# Patient Record
Sex: FEMALE | Race: WHITE | NOT HISPANIC OR LATINO | Employment: FULL TIME | URBAN - METROPOLITAN AREA
[De-identification: names, ages, dates, MRNs, and addresses within clinical notes are randomized per-mention and may not be internally consistent; named-entity substitution may affect disease eponyms.]

---

## 2023-03-30 LAB
ALANINE AMINOTRANSFERASE (SGPT) (U/L) IN SER/PLAS: 13 U/L (ref 7–45)
ALBUMIN (G/DL) IN SER/PLAS: 4.5 G/DL (ref 3.4–5)
ALKALINE PHOSPHATASE (U/L) IN SER/PLAS: 75 U/L (ref 33–136)
ANION GAP IN SER/PLAS: 14 MMOL/L (ref 10–20)
ASPARTATE AMINOTRANSFERASE (SGOT) (U/L) IN SER/PLAS: 13 U/L (ref 9–39)
BILIRUBIN TOTAL (MG/DL) IN SER/PLAS: 0.4 MG/DL (ref 0–1.2)
CALCIUM (MG/DL) IN SER/PLAS: 9.6 MG/DL (ref 8.6–10.6)
CARBON DIOXIDE, TOTAL (MMOL/L) IN SER/PLAS: 24 MMOL/L (ref 21–32)
CHLORIDE (MMOL/L) IN SER/PLAS: 105 MMOL/L (ref 98–107)
CHOLESTEROL (MG/DL) IN SER/PLAS: 156 MG/DL (ref 0–199)
CHOLESTEROL IN HDL (MG/DL) IN SER/PLAS: 66.7 MG/DL
CHOLESTEROL/HDL RATIO: 2.3
CREATININE (MG/DL) IN SER/PLAS: 0.58 MG/DL (ref 0.5–1.05)
ESTIMATED AVERAGE GLUCOSE FOR HBA1C: 120 MG/DL
GFR FEMALE: >90 ML/MIN/1.73M2
GLUCOSE (MG/DL) IN SER/PLAS: 95 MG/DL (ref 74–99)
HEMOGLOBIN A1C/HEMOGLOBIN TOTAL IN BLOOD: 5.8 %
LDL: 66 MG/DL (ref 0–99)
POTASSIUM (MMOL/L) IN SER/PLAS: 4.3 MMOL/L (ref 3.5–5.3)
PROTEIN TOTAL: 7.3 G/DL (ref 6.4–8.2)
SODIUM (MMOL/L) IN SER/PLAS: 139 MMOL/L (ref 136–145)
THYROTROPIN (MIU/L) IN SER/PLAS BY DETECTION LIMIT <= 0.05 MIU/L: 0.01 MIU/L (ref 0.44–3.98)
THYROXINE (T4) FREE (NG/DL) IN SER/PLAS: 1.48 NG/DL (ref 0.78–1.48)
TRIGLYCERIDE (MG/DL) IN SER/PLAS: 115 MG/DL (ref 0–149)
UREA NITROGEN (MG/DL) IN SER/PLAS: 20 MG/DL (ref 6–23)
VLDL: 23 MG/DL (ref 0–40)

## 2023-03-31 ENCOUNTER — TELEPHONE (OUTPATIENT)
Dept: PRIMARY CARE | Facility: CLINIC | Age: 63
End: 2023-03-31
Payer: COMMERCIAL

## 2023-03-31 DIAGNOSIS — E03.9 HYPOTHYROIDISM, UNSPECIFIED TYPE: ICD-10-CM

## 2023-03-31 DIAGNOSIS — K21.9 GASTROESOPHAGEAL REFLUX DISEASE, UNSPECIFIED WHETHER ESOPHAGITIS PRESENT: ICD-10-CM

## 2023-03-31 DIAGNOSIS — I10 HYPERTENSION, UNSPECIFIED TYPE: ICD-10-CM

## 2023-03-31 RX ORDER — OLMESARTAN MEDOXOMIL 40 MG/1
40 TABLET ORAL DAILY
COMMUNITY
Start: 2022-01-11 | End: 2023-06-01 | Stop reason: SDUPTHER

## 2023-03-31 RX ORDER — ESOMEPRAZOLE MAGNESIUM 40 MG/1
40 CAPSULE, DELAYED RELEASE ORAL DAILY
Qty: 90 CAPSULE | Refills: 0 | Status: SHIPPED | OUTPATIENT
Start: 2023-03-31 | End: 2023-04-25 | Stop reason: SDUPTHER

## 2023-03-31 RX ORDER — ASPIRIN 81 MG/1
81 TABLET ORAL DAILY
Qty: 90 TABLET | Refills: 0 | Status: SHIPPED | OUTPATIENT
Start: 2023-03-31 | End: 2023-04-25 | Stop reason: SDUPTHER

## 2023-03-31 RX ORDER — ESOMEPRAZOLE MAGNESIUM 40 MG/1
1 CAPSULE, DELAYED RELEASE ORAL DAILY
COMMUNITY
Start: 2020-01-22 | End: 2023-03-31 | Stop reason: SDUPTHER

## 2023-03-31 RX ORDER — ASPIRIN 81 MG/1
1 TABLET ORAL DAILY
COMMUNITY
Start: 2021-10-06 | End: 2023-03-31 | Stop reason: SDUPTHER

## 2023-03-31 RX ORDER — LEVOTHYROXINE SODIUM 150 UG/1
1 TABLET ORAL DAILY
COMMUNITY
Start: 2018-08-09 | End: 2023-03-31 | Stop reason: SDUPTHER

## 2023-03-31 RX ORDER — LEVOTHYROXINE SODIUM 150 UG/1
150 TABLET ORAL DAILY
Qty: 90 TABLET | Refills: 0 | Status: SHIPPED | OUTPATIENT
Start: 2023-03-31 | End: 2023-04-25 | Stop reason: SDUPTHER

## 2023-03-31 NOTE — TELEPHONE ENCOUNTER
Last appt-11/29/22  Next appt-4/25/23  Last BW-3/30/23   smm    Spoke to pt and cardiology fills Olmesartan and pt will call them.

## 2023-03-31 NOTE — TELEPHONE ENCOUNTER
Pt called requesting Rx refills on   Asprin 81 mg   Esomeprazole 40 mg (out)  Olmesartin 40 mg   Synthroid 150 mg   Send to [facility]   Scheduled 4/25/23

## 2023-04-03 PROBLEM — M25.562 BILATERAL KNEE PAIN: Status: ACTIVE | Noted: 2023-04-03

## 2023-04-03 PROBLEM — M54.16 LUMBAR RADICULAR PAIN: Status: ACTIVE | Noted: 2023-04-03
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
## 2023-04-03 PROBLEM — H25.813 COMBINED FORM OF SENILE CATARACT OF BOTH EYES: Status: ACTIVE | Noted: 2023-04-03

## 2023-04-03 PROBLEM — N95.2 ATROPHIC VAGINITIS: Status: ACTIVE | Noted: 2023-04-03

## 2023-04-03 PROBLEM — J20.9 BRONCHITIS, ACUTE: Status: ACTIVE | Noted: 2023-04-03

## 2023-04-03 PROBLEM — K21.9 GERD (GASTROESOPHAGEAL REFLUX DISEASE): Status: ACTIVE | Noted: 2023-04-03

## 2023-04-03 PROBLEM — M25.561 RIGHT KNEE PAIN: Status: ACTIVE | Noted: 2023-04-03

## 2023-04-03 PROBLEM — M19.90 ARTHRITIS: Status: ACTIVE | Noted: 2023-04-03

## 2023-04-03 PROBLEM — B34.9 VIRAL INFECTION: Status: ACTIVE | Noted: 2023-04-03

## 2023-04-03 PROBLEM — H93.19 TINNITUS: Status: ACTIVE | Noted: 2023-04-03

## 2023-04-03 PROBLEM — M25.551 HIP PAIN, RIGHT: Status: ACTIVE | Noted: 2023-04-03

## 2023-04-03 PROBLEM — R20.0 NUMBNESS AND TINGLING OF HAND: Status: ACTIVE | Noted: 2023-04-03

## 2023-04-03 PROBLEM — R20.2 NUMBNESS AND TINGLING OF HAND: Status: ACTIVE | Noted: 2023-04-03

## 2023-04-03 PROBLEM — H52.03 HYPERMETROPIA OF BOTH EYES: Status: ACTIVE | Noted: 2023-04-03

## 2023-04-03 PROBLEM — J45.909 ASTHMATIC BRONCHITIS (HHS-HCC): Status: ACTIVE | Noted: 2023-04-03

## 2023-04-03 PROBLEM — R06.00 DYSPNEA: Status: ACTIVE | Noted: 2023-04-03

## 2023-04-03 PROBLEM — Z96.641 STATUS POST TOTAL REPLACEMENT OF RIGHT HIP: Status: ACTIVE | Noted: 2023-04-03

## 2023-04-03 PROBLEM — J01.00 ACUTE MAXILLARY SINUSITIS: Status: ACTIVE | Noted: 2023-04-03

## 2023-04-03 PROBLEM — H52.4 MYOPIA WITH PRESBYOPIA OF BOTH EYES: Status: ACTIVE | Noted: 2023-04-03

## 2023-04-03 PROBLEM — G57.11 MERALGIA PARESTHETICA OF RIGHT SIDE: Status: ACTIVE | Noted: 2023-04-03

## 2023-04-03 PROBLEM — M51.36 OTHER INTERVERTEBRAL DISC DEGENERATION, LUMBAR REGION: Status: ACTIVE | Noted: 2023-04-03

## 2023-04-03 PROBLEM — R30.0 DYSURIA: Status: ACTIVE | Noted: 2023-04-03

## 2023-04-03 PROBLEM — R94.39 ABNORMAL STRESS TEST: Status: ACTIVE | Noted: 2023-04-03

## 2023-04-03 PROBLEM — E07.9 THYROID TROUBLE: Status: ACTIVE | Noted: 2023-04-03

## 2023-04-03 PROBLEM — L73.9 FOLLICULITIS: Status: ACTIVE | Noted: 2023-04-03

## 2023-04-03 PROBLEM — M25.561 BILATERAL KNEE PAIN: Status: ACTIVE | Noted: 2023-04-03

## 2023-04-03 PROBLEM — H52.13 MYOPIA WITH PRESBYOPIA OF BOTH EYES: Status: ACTIVE | Noted: 2023-04-03

## 2023-04-03 PROBLEM — R51.9 HEADACHE: Status: ACTIVE | Noted: 2023-04-03

## 2023-04-03 PROBLEM — E11.9 TYPE 2 DIABETES MELLITUS WITHOUT COMPLICATIONS (MULTI): Status: ACTIVE | Noted: 2023-04-03

## 2023-04-03 PROBLEM — N64.4 BREAST TENDERNESS: Status: ACTIVE | Noted: 2023-04-03

## 2023-04-03 PROBLEM — M51.369 OTHER INTERVERTEBRAL DISC DEGENERATION, LUMBAR REGION: Status: ACTIVE | Noted: 2023-04-03

## 2023-04-03 PROBLEM — M54.9 BACK PAIN: Status: ACTIVE | Noted: 2023-04-03

## 2023-04-03 PROBLEM — F41.9 ANXIETY: Status: ACTIVE | Noted: 2023-04-03

## 2023-04-03 PROBLEM — R06.02 EXERTIONAL SHORTNESS OF BREATH: Status: ACTIVE | Noted: 2023-04-03

## 2023-04-03 PROBLEM — E78.5 HYPERLIPIDEMIA: Status: ACTIVE | Noted: 2023-04-03

## 2023-04-03 PROBLEM — E03.9 HYPOTHYROIDISM: Status: ACTIVE | Noted: 2023-04-03

## 2023-04-03 PROBLEM — I10 HYPERTENSION: Status: ACTIVE | Noted: 2023-04-03

## 2023-04-03 PROBLEM — J32.9 SINUSITIS: Status: ACTIVE | Noted: 2023-04-03

## 2023-04-03 PROBLEM — T75.3XXA MOTION SICKNESS: Status: ACTIVE | Noted: 2023-04-03

## 2023-04-03 RX ORDER — SCOLOPAMINE TRANSDERMAL SYSTEM 1 MG/1
1 PATCH, EXTENDED RELEASE TRANSDERMAL
COMMUNITY
Start: 2022-06-10 | End: 2023-04-25 | Stop reason: SDUPTHER

## 2023-04-03 RX ORDER — ROSUVASTATIN CALCIUM 40 MG/1
1 TABLET, COATED ORAL DAILY
COMMUNITY
Start: 2022-01-11 | End: 2023-04-25 | Stop reason: SDUPTHER

## 2023-04-03 RX ORDER — METFORMIN HYDROCHLORIDE 500 MG/1
500 TABLET ORAL EVERY 12 HOURS
COMMUNITY
End: 2023-04-25 | Stop reason: SDUPTHER

## 2023-04-03 RX ORDER — SERTRALINE HYDROCHLORIDE 100 MG/1
1 TABLET, FILM COATED ORAL DAILY
COMMUNITY
Start: 2016-04-18 | End: 2023-04-25 | Stop reason: SDUPTHER

## 2023-04-03 RX ORDER — METHYLPREDNISOLONE 4 MG/1
4 TABLET ORAL
COMMUNITY
Start: 2023-03-23 | End: 2023-04-25 | Stop reason: ALTCHOICE

## 2023-04-25 ENCOUNTER — OFFICE VISIT (OUTPATIENT)
Dept: PRIMARY CARE | Facility: CLINIC | Age: 63
End: 2023-04-25
Payer: COMMERCIAL

## 2023-04-25 VITALS
TEMPERATURE: 98.3 F | WEIGHT: 194 LBS | SYSTOLIC BLOOD PRESSURE: 122 MMHG | BODY MASS INDEX: 33.3 KG/M2 | DIASTOLIC BLOOD PRESSURE: 68 MMHG

## 2023-04-25 DIAGNOSIS — Z79.4 TYPE 2 DIABETES MELLITUS WITHOUT COMPLICATION, WITH LONG-TERM CURRENT USE OF INSULIN (MULTI): Primary | ICD-10-CM

## 2023-04-25 DIAGNOSIS — E78.5 HYPERLIPIDEMIA, UNSPECIFIED HYPERLIPIDEMIA TYPE: ICD-10-CM

## 2023-04-25 DIAGNOSIS — T75.3XXS MOTION SICKNESS, SEQUELA: ICD-10-CM

## 2023-04-25 DIAGNOSIS — K21.9 GASTROESOPHAGEAL REFLUX DISEASE, UNSPECIFIED WHETHER ESOPHAGITIS PRESENT: ICD-10-CM

## 2023-04-25 DIAGNOSIS — F41.9 ANXIETY: ICD-10-CM

## 2023-04-25 DIAGNOSIS — E11.9 TYPE 2 DIABETES MELLITUS WITHOUT COMPLICATION, WITH LONG-TERM CURRENT USE OF INSULIN (MULTI): Primary | ICD-10-CM

## 2023-04-25 DIAGNOSIS — R23.2 FACIAL FLUSHING: ICD-10-CM

## 2023-04-25 DIAGNOSIS — E03.9 HYPOTHYROIDISM, UNSPECIFIED TYPE: ICD-10-CM

## 2023-04-25 DIAGNOSIS — I10 HYPERTENSION, UNSPECIFIED TYPE: ICD-10-CM

## 2023-04-25 PROCEDURE — 3078F DIAST BP <80 MM HG: CPT | Performed by: FAMILY MEDICINE

## 2023-04-25 PROCEDURE — 1036F TOBACCO NON-USER: CPT | Performed by: FAMILY MEDICINE

## 2023-04-25 PROCEDURE — 3044F HG A1C LEVEL LT 7.0%: CPT | Performed by: FAMILY MEDICINE

## 2023-04-25 PROCEDURE — 99214 OFFICE O/P EST MOD 30 MIN: CPT | Performed by: FAMILY MEDICINE

## 2023-04-25 PROCEDURE — 4010F ACE/ARB THERAPY RXD/TAKEN: CPT | Performed by: FAMILY MEDICINE

## 2023-04-25 PROCEDURE — 3074F SYST BP LT 130 MM HG: CPT | Performed by: FAMILY MEDICINE

## 2023-04-25 RX ORDER — METFORMIN HYDROCHLORIDE 500 MG/1
500 TABLET ORAL EVERY 12 HOURS
Qty: 180 TABLET | Refills: 1 | Status: SHIPPED | OUTPATIENT
Start: 2023-04-25 | End: 2023-11-07 | Stop reason: SDUPTHER

## 2023-04-25 RX ORDER — ICOSAPENT ETHYL 1 G/1
2 CAPSULE ORAL
COMMUNITY
End: 2023-06-01 | Stop reason: SDUPTHER

## 2023-04-25 RX ORDER — ROSUVASTATIN CALCIUM 40 MG/1
40 TABLET, COATED ORAL DAILY
Qty: 90 TABLET | Refills: 1 | Status: SHIPPED | OUTPATIENT
Start: 2023-04-25 | End: 2023-11-07 | Stop reason: SDUPTHER

## 2023-04-25 RX ORDER — ASPIRIN 81 MG/1
81 TABLET ORAL DAILY
Qty: 90 TABLET | Refills: 3 | Status: SHIPPED | OUTPATIENT
Start: 2023-04-25

## 2023-04-25 RX ORDER — ESOMEPRAZOLE MAGNESIUM 40 MG/1
40 CAPSULE, DELAYED RELEASE ORAL DAILY
Qty: 90 CAPSULE | Refills: 1 | Status: SHIPPED | OUTPATIENT
Start: 2023-04-25 | End: 2023-11-07 | Stop reason: SDUPTHER

## 2023-04-25 RX ORDER — SCOLOPAMINE TRANSDERMAL SYSTEM 1 MG/1
1 PATCH, EXTENDED RELEASE TRANSDERMAL
Qty: 10 PATCH | Refills: 0 | Status: SHIPPED | OUTPATIENT
Start: 2023-04-25 | End: 2023-11-07 | Stop reason: ALTCHOICE

## 2023-04-25 RX ORDER — SERTRALINE HYDROCHLORIDE 100 MG/1
100 TABLET, FILM COATED ORAL DAILY
Qty: 90 TABLET | Refills: 1 | Status: SHIPPED | OUTPATIENT
Start: 2023-04-25 | End: 2023-11-07 | Stop reason: SDUPTHER

## 2023-04-25 RX ORDER — ALPRAZOLAM 0.5 MG/1
0.5 TABLET ORAL 3 TIMES DAILY PRN
Qty: 10 TABLET | Refills: 0 | Status: SHIPPED | OUTPATIENT
Start: 2023-04-25 | End: 2023-11-07 | Stop reason: ALTCHOICE

## 2023-04-25 RX ORDER — LEVOTHYROXINE SODIUM 150 UG/1
150 TABLET ORAL DAILY
Qty: 90 TABLET | Refills: 1 | Status: SHIPPED | OUTPATIENT
Start: 2023-04-25 | End: 2023-11-07 | Stop reason: SDUPTHER

## 2023-04-25 NOTE — PROGRESS NOTES
Subjective   Patient ID: Mary العراقي is a 62 y.o. female who presents for Follow-up (EP. Here for follow up on blood work and medication.).  HPI  Feels well, no specific complaints or concerns today.  Saw 2 different doctors- one for hip pain and one for back pain, then was referred pain mgmt- may be getting epidural injection    Requesting something to help with anxiety on long flight to [place]     Sees Dr. Farnsworth (cardiologist)    Sees Dr. Aldrich for gyn care     Review of Systems  General: no fever or night sweats, no change in weight  Eyes: no vision disturbance  ENT: no mouth lesions, no sore throat, and no dysphagia  CV: no chest pain, no palpitations, no lower extremity edema  Resp: no shortness of breath, no cough  GI: no abdominal pain, no change in bowel habits  : no urinary problems  MSK: no arthralgias, myalgias, or back pain  Skin; no rashes or new/changed skin lesions  Neuro: no headaches      Objective   Visit Vitals  /68   Temp 36.8 °C (98.3 °F) (Oral)      Physical Exam  Alert, well-appearing.    Neck: Supple. No palpable masses. Thyroid was not enlarged.    CVS: RRR, no murmurs. No peripheral edema.    Respiratory: Normal inspirations and expirations. Clear and equal breath sounds.    GI: Soft, nontender, no masses or hepatosplenomegaly.    Reviewed recent labs: all WNL including HgbA!C 5.8%  Assessment/Plan   Diagnoses and all orders for this visit:  Type 2 diabetes mellitus without complication, with long-term current use of insulin (CMS/[facility])  -     metFORMIN (Glucophage) 500 mg tablet; Take 1 tablet (500 mg) by mouth in the morning and 1 tablet (500 mg) in the evening. Take with food..  Hypothyroidism, unspecified type  -     Synthroid 150 mcg tablet; Take 1 tablet (150 mcg) by mouth once daily.  Hypertension, unspecified type  -     aspirin 81 mg EC tablet; Take 1 tablet (81 mg) by mouth once daily.  Anxiety  -     sertraline (Zoloft) 100 mg tablet; Take 1 tablet (100 mg) by mouth once  daily.  -     ALPRAZolam (Xanax) 0.5 mg tablet; Take 1 tablet (0.5 mg) by mouth 3 times a day as needed for anxiety.  Motion sickness, sequela  -     scopolamine (Transderm-Scop) 1 mg over 3 days patch 3 day; Place 1 patch on the skin every 3 days.  Hyperlipidemia, unspecified hyperlipidemia type  -     rosuvastatin (Crestor) 40 mg tablet; Take 1 tablet (40 mg) by mouth once daily.  Gastroesophageal reflux disease, unspecified whether esophagitis present  -     esomeprazole (NexIUM) 40 mg DR capsule; Take 1 capsule (40 mg) by mouth once daily.  Facial flushing  -     Referral to Dermatology; Future        Martha Thompson MD  Family Medicine   [facility]

## 2023-05-16 ENCOUNTER — APPOINTMENT (OUTPATIENT)
Dept: PRIMARY CARE | Facility: CLINIC | Age: 63
End: 2023-05-16
Payer: COMMERCIAL

## 2023-06-01 DIAGNOSIS — I10 HYPERTENSION, UNSPECIFIED TYPE: ICD-10-CM

## 2023-06-01 DIAGNOSIS — E78.5 HYPERLIPIDEMIA, UNSPECIFIED HYPERLIPIDEMIA TYPE: ICD-10-CM

## 2023-06-01 NOTE — TELEPHONE ENCOUNTER
Pt called and explained she no longer has to see her cardiologist, Dr. Farnsworth and she needs refills on her medication prescribed by him.  His office advised pt to call PCP for refills.   Omesartan 40 mg, 1 every day   Vasbeta 1 gm, 2 capsules BID   Send to [email or web address] for 90 day supply per insurance   Last seen 04/25/23    Pt is leaving for vacation on  6/11/23

## 2023-06-02 RX ORDER — OLMESARTAN MEDOXOMIL 40 MG/1
40 TABLET ORAL DAILY
Qty: 90 TABLET | Refills: 0 | Status: SHIPPED | OUTPATIENT
Start: 2023-06-02 | End: 2023-10-06 | Stop reason: SDUPTHER

## 2023-06-02 RX ORDER — ICOSAPENT ETHYL 1 G/1
2 CAPSULE ORAL
Qty: 360 CAPSULE | Refills: 0 | Status: SHIPPED | OUTPATIENT
Start: 2023-06-02 | End: 2023-11-07 | Stop reason: SDUPTHER

## 2023-10-06 DIAGNOSIS — I10 HYPERTENSION, UNSPECIFIED TYPE: ICD-10-CM

## 2023-10-06 RX ORDER — OLMESARTAN MEDOXOMIL 40 MG/1
40 TABLET ORAL DAILY
Qty: 90 TABLET | Refills: 0 | Status: SHIPPED | OUTPATIENT
Start: 2023-10-06 | End: 2023-11-07 | Stop reason: SDUPTHER

## 2023-10-06 NOTE — TELEPHONE ENCOUNTER
Patient ldm on machine, needs refill on  olmesartan (BENIcar) 40 mg - out of RX for 3 wks  Send to [facility]  Last appt. 4/25/23  Upcoming appt. None - I called & left message for patient to make an appt.

## 2023-10-20 DIAGNOSIS — E11.9 TYPE 2 DIABETES MELLITUS WITHOUT COMPLICATION, WITH LONG-TERM CURRENT USE OF INSULIN (MULTI): ICD-10-CM

## 2023-10-20 DIAGNOSIS — I10 HYPERTENSION, UNSPECIFIED TYPE: ICD-10-CM

## 2023-10-20 DIAGNOSIS — E78.5 HYPERLIPIDEMIA, UNSPECIFIED HYPERLIPIDEMIA TYPE: ICD-10-CM

## 2023-10-20 DIAGNOSIS — E03.9 HYPOTHYROIDISM, UNSPECIFIED TYPE: ICD-10-CM

## 2023-10-20 DIAGNOSIS — Z79.4 TYPE 2 DIABETES MELLITUS WITHOUT COMPLICATION, WITH LONG-TERM CURRENT USE OF INSULIN (MULTI): ICD-10-CM

## 2023-11-06 ENCOUNTER — LAB (OUTPATIENT)
Dept: LAB | Facility: LAB | Age: 63
End: 2023-11-06
Payer: COMMERCIAL

## 2023-11-06 DIAGNOSIS — E78.5 HYPERLIPIDEMIA, UNSPECIFIED HYPERLIPIDEMIA TYPE: ICD-10-CM

## 2023-11-06 DIAGNOSIS — Z79.4 TYPE 2 DIABETES MELLITUS WITHOUT COMPLICATION, WITH LONG-TERM CURRENT USE OF INSULIN (MULTI): ICD-10-CM

## 2023-11-06 DIAGNOSIS — I10 HYPERTENSION, UNSPECIFIED TYPE: ICD-10-CM

## 2023-11-06 DIAGNOSIS — E03.9 HYPOTHYROIDISM, UNSPECIFIED TYPE: ICD-10-CM

## 2023-11-06 DIAGNOSIS — E11.9 TYPE 2 DIABETES MELLITUS WITHOUT COMPLICATION, WITH LONG-TERM CURRENT USE OF INSULIN (MULTI): ICD-10-CM

## 2023-11-06 LAB
ALBUMIN SERPL BCP-MCNC: 4.4 G/DL (ref 3.4–5)
ALP SERPL-CCNC: 69 U/L (ref 33–136)
ALT SERPL W P-5'-P-CCNC: 18 U/L (ref 7–45)
ANION GAP SERPL CALC-SCNC: 18 MMOL/L (ref 10–20)
AST SERPL W P-5'-P-CCNC: 21 U/L (ref 9–39)
BILIRUB SERPL-MCNC: 0.5 MG/DL (ref 0–1.2)
BUN SERPL-MCNC: 23 MG/DL (ref 6–23)
CALCIUM SERPL-MCNC: 9.5 MG/DL (ref 8.6–10.6)
CHLORIDE SERPL-SCNC: 104 MMOL/L (ref 98–107)
CHOLEST SERPL-MCNC: 173 MG/DL (ref 0–199)
CHOLESTEROL/HDL RATIO: 3.8
CO2 SERPL-SCNC: 20 MMOL/L (ref 21–32)
CREAT SERPL-MCNC: 0.82 MG/DL (ref 0.5–1.05)
EST. AVERAGE GLUCOSE BLD GHB EST-MCNC: 120 MG/DL
GFR SERPL CREATININE-BSD FRML MDRD: 80 ML/MIN/1.73M*2
GLUCOSE SERPL-MCNC: 105 MG/DL (ref 74–99)
HBA1C MFR BLD: 5.8 %
HDLC SERPL-MCNC: 45.4 MG/DL
LDLC SERPL CALC-MCNC: 81 MG/DL
NON HDL CHOLESTEROL: 128 MG/DL (ref 0–149)
POTASSIUM SERPL-SCNC: 4.3 MMOL/L (ref 3.5–5.3)
PROT SERPL-MCNC: 7 G/DL (ref 6.4–8.2)
SODIUM SERPL-SCNC: 138 MMOL/L (ref 136–145)
T4 FREE SERPL-MCNC: 1.27 NG/DL (ref 0.78–1.48)
TRIGL SERPL-MCNC: 235 MG/DL (ref 0–149)
TSH SERPL-ACNC: 0.01 MIU/L (ref 0.44–3.98)
VLDL: 47 MG/DL (ref 0–40)

## 2023-11-06 PROCEDURE — 80061 LIPID PANEL: CPT

## 2023-11-06 PROCEDURE — 83036 HEMOGLOBIN GLYCOSYLATED A1C: CPT

## 2023-11-06 PROCEDURE — 84443 ASSAY THYROID STIM HORMONE: CPT

## 2023-11-06 PROCEDURE — 36415 COLL VENOUS BLD VENIPUNCTURE: CPT

## 2023-11-06 PROCEDURE — 84439 ASSAY OF FREE THYROXINE: CPT

## 2023-11-06 PROCEDURE — 80053 COMPREHEN METABOLIC PANEL: CPT

## 2023-11-07 ENCOUNTER — OFFICE VISIT (OUTPATIENT)
Dept: PRIMARY CARE | Facility: CLINIC | Age: 63
End: 2023-11-07
Payer: COMMERCIAL

## 2023-11-07 VITALS
DIASTOLIC BLOOD PRESSURE: 76 MMHG | TEMPERATURE: 98.6 F | BODY MASS INDEX: 34.33 KG/M2 | WEIGHT: 200 LBS | SYSTOLIC BLOOD PRESSURE: 128 MMHG

## 2023-11-07 DIAGNOSIS — I10 HYPERTENSION, UNSPECIFIED TYPE: ICD-10-CM

## 2023-11-07 DIAGNOSIS — E03.9 HYPOTHYROIDISM, UNSPECIFIED TYPE: ICD-10-CM

## 2023-11-07 DIAGNOSIS — L90.8 SKIN AGING: ICD-10-CM

## 2023-11-07 DIAGNOSIS — E11.9 TYPE 2 DIABETES MELLITUS WITHOUT COMPLICATION, WITHOUT LONG-TERM CURRENT USE OF INSULIN (MULTI): Primary | ICD-10-CM

## 2023-11-07 DIAGNOSIS — E78.5 HYPERLIPIDEMIA, UNSPECIFIED HYPERLIPIDEMIA TYPE: ICD-10-CM

## 2023-11-07 DIAGNOSIS — F41.9 ANXIETY: ICD-10-CM

## 2023-11-07 DIAGNOSIS — K21.9 GASTROESOPHAGEAL REFLUX DISEASE, UNSPECIFIED WHETHER ESOPHAGITIS PRESENT: ICD-10-CM

## 2023-11-07 DIAGNOSIS — R22.33 NODULE OF FINGER OF BOTH HANDS: ICD-10-CM

## 2023-11-07 PROCEDURE — 3078F DIAST BP <80 MM HG: CPT | Performed by: FAMILY MEDICINE

## 2023-11-07 PROCEDURE — 3074F SYST BP LT 130 MM HG: CPT | Performed by: FAMILY MEDICINE

## 2023-11-07 PROCEDURE — 1036F TOBACCO NON-USER: CPT | Performed by: FAMILY MEDICINE

## 2023-11-07 PROCEDURE — 4010F ACE/ARB THERAPY RXD/TAKEN: CPT | Performed by: FAMILY MEDICINE

## 2023-11-07 PROCEDURE — 3048F LDL-C <100 MG/DL: CPT | Performed by: FAMILY MEDICINE

## 2023-11-07 PROCEDURE — 3044F HG A1C LEVEL LT 7.0%: CPT | Performed by: FAMILY MEDICINE

## 2023-11-07 PROCEDURE — 99214 OFFICE O/P EST MOD 30 MIN: CPT | Performed by: FAMILY MEDICINE

## 2023-11-07 RX ORDER — LEVOTHYROXINE SODIUM 150 UG/1
150 TABLET ORAL DAILY
Qty: 90 TABLET | Refills: 1 | Status: SHIPPED | OUTPATIENT
Start: 2023-11-07 | End: 2024-05-31 | Stop reason: SDUPTHER

## 2023-11-07 RX ORDER — OLMESARTAN MEDOXOMIL 40 MG/1
40 TABLET ORAL DAILY
Qty: 90 TABLET | Refills: 1 | Status: SHIPPED | OUTPATIENT
Start: 2023-11-07 | End: 2024-05-31 | Stop reason: SDUPTHER

## 2023-11-07 RX ORDER — ESOMEPRAZOLE MAGNESIUM 40 MG/1
40 CAPSULE, DELAYED RELEASE ORAL DAILY
Qty: 90 CAPSULE | Refills: 1 | Status: SHIPPED | OUTPATIENT
Start: 2023-11-07 | End: 2024-05-21 | Stop reason: SDUPTHER

## 2023-11-07 RX ORDER — ROSUVASTATIN CALCIUM 40 MG/1
40 TABLET, COATED ORAL DAILY
Qty: 90 TABLET | Refills: 1 | Status: SHIPPED | OUTPATIENT
Start: 2023-11-07 | End: 2024-05-31 | Stop reason: SDUPTHER

## 2023-11-07 RX ORDER — SERTRALINE HYDROCHLORIDE 100 MG/1
100 TABLET, FILM COATED ORAL DAILY
Qty: 90 TABLET | Refills: 1 | Status: SHIPPED | OUTPATIENT
Start: 2023-11-07 | End: 2024-05-31 | Stop reason: SDUPTHER

## 2023-11-07 RX ORDER — ICOSAPENT ETHYL 1 G/1
2 CAPSULE ORAL
Qty: 360 CAPSULE | Refills: 1 | Status: SHIPPED | OUTPATIENT
Start: 2023-11-07 | End: 2024-05-31 | Stop reason: SDUPTHER

## 2023-11-07 RX ORDER — METFORMIN HYDROCHLORIDE 500 MG/1
500 TABLET ORAL EVERY 12 HOURS
Qty: 180 TABLET | Refills: 1 | Status: SHIPPED | OUTPATIENT
Start: 2023-11-07 | End: 2024-05-31 | Stop reason: SDUPTHER

## 2023-11-07 NOTE — PROGRESS NOTES
Subjective   Patient ID: Mary العراقي is a 63 y.o. female who presents for Follow-up (EP. Here for follow up ,blood work and medication.).  HPI  Feels well  Concerned about nodules on fingers  C/o feeling gasy and bloated   Has had upper endoscopy and colonoscopy  Would like to see a dermatologist for some skin issues   Wants to try to wean off sertraline, thinks she may not need it  Review of Systems  Genl: The patient has been in good health without recent weight change, fevers, or night sweats  CVS: No chest pain, irregular heartbeat, shortness of breath, or swollen ankles  Resp: No cough or difficulty breathing  GI: No change in bowel habits or abdominal pain. No heartburn  : No urinary problems.    Objective   Visit Vitals  /76   Temp 37 °C (98.6 °F) (Oral)      Physical Exam  Alert, well-appearing.    Neck: Supple. No palpable masses. Thyroid was not enlarged.    CVS: RRR, no murmurs. No peripheral edema.    Respiratory: Normal inspirations and expirations. Clear and equal breath sounds.    GI: Soft, nontender, no masses or hepatosplenomegaly.     Assessment/Plan   Diagnoses and all orders for this visit:  Type 2 diabetes mellitus without complication, without long-term current use of insulin (CMS/[facility])  -     metFORMIN (Glucophage) 500 mg tablet; Take 1 tablet (500 mg) by mouth every 12 hours. Take with food.  Nodule of finger of both hands  -     Referral to Orthopaedic Surgery; Future  Skin aging  -     Referral to Dermatology  Gastroesophageal reflux disease, unspecified whether esophagitis present  -     esomeprazole (NexIUM) 40 mg DR capsule; Take 1 capsule (40 mg) by mouth once daily.  Hypertension, unspecified type  -     olmesartan (BENIcar) 40 mg tablet; Take 1 tablet (40 mg) by mouth once daily.  Hyperlipidemia, unspecified hyperlipidemia type  -     rosuvastatin (Crestor) 40 mg tablet; Take 1 tablet (40 mg) by mouth once daily.  -     icosapent ethyL (Vascepa) 1 gram capsule; Take 2 capsules  (2 g) by mouth 2 times a day with meals.  Hypothyroidism, unspecified type  -     Synthroid 150 mcg tablet; Take 1 tablet (150 mcg) by mouth once daily.  Anxiety  -     sertraline (Zoloft) 100 mg tablet; Take 1 tablet (100 mg) by mouth once daily.      Contact GI doctor for appointment to further discuss her GI concerns (gas and bloating)      Martha Thompson MD  Family Medicine   [facility]

## 2024-02-26 ENCOUNTER — OFFICE VISIT (OUTPATIENT)
Dept: ORTHOPEDIC SURGERY | Facility: CLINIC | Age: 64
End: 2024-02-26
Payer: COMMERCIAL

## 2024-02-26 VITALS — BODY MASS INDEX: 33.97 KG/M2 | HEIGHT: 64 IN | WEIGHT: 199 LBS

## 2024-02-26 DIAGNOSIS — M79.644 CHRONIC PAIN OF RIGHT THUMB: Primary | ICD-10-CM

## 2024-02-26 DIAGNOSIS — M65.311 TRIGGER THUMB OF RIGHT HAND: ICD-10-CM

## 2024-02-26 DIAGNOSIS — G89.29 CHRONIC PAIN OF RIGHT THUMB: Primary | ICD-10-CM

## 2024-02-26 PROCEDURE — 99213 OFFICE O/P EST LOW 20 MIN: CPT | Performed by: ORTHOPAEDIC SURGERY

## 2024-02-26 PROCEDURE — 1036F TOBACCO NON-USER: CPT | Performed by: ORTHOPAEDIC SURGERY

## 2024-02-26 PROCEDURE — 4010F ACE/ARB THERAPY RXD/TAKEN: CPT | Performed by: ORTHOPAEDIC SURGERY

## 2024-02-26 PROCEDURE — 20550 NJX 1 TENDON SHEATH/LIGAMENT: CPT | Performed by: ORTHOPAEDIC SURGERY

## 2024-02-26 RX ORDER — TRIAMCINOLONE ACETONIDE 40 MG/ML
5 INJECTION, SUSPENSION INTRA-ARTICULAR; INTRAMUSCULAR
Status: COMPLETED | OUTPATIENT
Start: 2024-02-26 | End: 2024-02-26

## 2024-02-26 RX ORDER — LIDOCAINE HYDROCHLORIDE 20 MG/ML
0.5 INJECTION, SOLUTION INFILTRATION; PERINEURAL
Status: COMPLETED | OUTPATIENT
Start: 2024-02-26 | End: 2024-02-26

## 2024-02-26 RX ADMIN — TRIAMCINOLONE ACETONIDE 5 MG: 40 INJECTION, SUSPENSION INTRA-ARTICULAR; INTRAMUSCULAR at 14:09

## 2024-02-26 RX ADMIN — LIDOCAINE HYDROCHLORIDE 0.5 ML: 20 INJECTION, SOLUTION INFILTRATION; PERINEURAL at 14:09

## 2024-02-26 NOTE — PROGRESS NOTES
Subjective    Patient ID: Mary العراقي is a 63 y.o. female.    Chief Complaint: OTHER (RT HAND PAIN X1YR/THUMB X1-2 MONTHS)     Last Surgery: No surgery found  Last Surgery Date: No surgery found    HPI  Patient is a 63-year-old right-hand-dominant female who comes in with at least a 1 month history of pain and locking in her right thumb.  She does not recall any specific trauma.  She has not had any previous treatment.  She currently denies numbness and paresthesias in her right hand.    Objective   Ortho Exam  Patient is in no acute distress.  Exam of her right hand and wrist reveals her skin envelope is intact.  She has Heberden's nodes at multiple fingers.  She is tender to palpation over the A1 pulley to the thumb.  The thumb was locking with flexion at the IP joint.  She had no other areas of point tenderness.    Assessment/Plan   Encounter Diagnoses:  Chronic pain of right thumb    Trigger thumb of right hand    Patient has arthritis at multiple small joints of her right hand.  However she also has a right trigger thumb.  She wished to treat this conservatively.  She elected undergo a Kenalog injection today in the office.    Hand / UE Inj/Asp: R thumb A1 for trigger finger on 2/26/2024 2:09 PM  Indications: tendon swelling  Details: 25 G needle, volar approach  Medications: 5 mg triamcinolone acetonide 40 mg/mL; 0.5 mL lidocaine 20 mg/mL (2 %)  Outcome: tolerated well, no immediate complications  Procedure, treatment alternatives, risks and benefits explained, specific risks discussed. Consent was given by the patient. Immediately prior to procedure a time out was called to verify the correct patient, procedure, equipment, support staff and site/side marked as required. Patient was prepped and draped in the usual sterile fashion.       Patient was informed to take about a week for the injection have an effect.  She may use her hands much as she can tolerate.  She will follow-up as her symptoms dictate.

## 2024-04-22 DIAGNOSIS — E03.9 HYPOTHYROIDISM, UNSPECIFIED TYPE: ICD-10-CM

## 2024-04-22 DIAGNOSIS — E78.5 HYPERLIPIDEMIA, UNSPECIFIED HYPERLIPIDEMIA TYPE: ICD-10-CM

## 2024-04-22 DIAGNOSIS — E11.9 TYPE 2 DIABETES MELLITUS WITHOUT COMPLICATION, WITHOUT LONG-TERM CURRENT USE OF INSULIN (MULTI): ICD-10-CM

## 2024-04-22 DIAGNOSIS — Z12.31 VISIT FOR SCREENING MAMMOGRAM: ICD-10-CM

## 2024-04-22 DIAGNOSIS — I10 HYPERTENSION, UNSPECIFIED TYPE: ICD-10-CM

## 2024-05-07 ENCOUNTER — APPOINTMENT (OUTPATIENT)
Dept: PRIMARY CARE | Facility: CLINIC | Age: 64
End: 2024-05-07
Payer: COMMERCIAL

## 2024-05-21 DIAGNOSIS — K21.9 GASTROESOPHAGEAL REFLUX DISEASE, UNSPECIFIED WHETHER ESOPHAGITIS PRESENT: ICD-10-CM

## 2024-05-21 RX ORDER — ESOMEPRAZOLE MAGNESIUM 40 MG/1
40 CAPSULE, DELAYED RELEASE ORAL DAILY
Qty: 90 CAPSULE | Refills: 1 | Status: SHIPPED | OUTPATIENT
Start: 2024-05-21

## 2024-05-21 NOTE — TELEPHONE ENCOUNTER
Pt called in requesting refill for  Esomeprazole 40 mg  [facility]   Last appt: 11/7/23  Next appt: 5/31/24

## 2024-05-23 ENCOUNTER — HOSPITAL ENCOUNTER (OUTPATIENT)
Dept: RADIOLOGY | Facility: CLINIC | Age: 64
Discharge: HOME | End: 2024-05-23
Payer: COMMERCIAL

## 2024-05-23 VITALS — BODY MASS INDEX: 31.58 KG/M2 | HEIGHT: 64 IN | WEIGHT: 185 LBS

## 2024-05-23 DIAGNOSIS — Z12.31 VISIT FOR SCREENING MAMMOGRAM: ICD-10-CM

## 2024-05-23 PROCEDURE — 77067 SCR MAMMO BI INCL CAD: CPT | Performed by: RADIOLOGY

## 2024-05-23 PROCEDURE — 77067 SCR MAMMO BI INCL CAD: CPT

## 2024-05-23 PROCEDURE — 77063 BREAST TOMOSYNTHESIS BI: CPT | Performed by: RADIOLOGY

## 2024-05-30 ENCOUNTER — LAB (OUTPATIENT)
Dept: LAB | Facility: LAB | Age: 64
End: 2024-05-30
Payer: COMMERCIAL

## 2024-05-30 DIAGNOSIS — E03.9 HYPOTHYROIDISM, UNSPECIFIED TYPE: ICD-10-CM

## 2024-05-30 DIAGNOSIS — E11.9 TYPE 2 DIABETES MELLITUS WITHOUT COMPLICATION, WITHOUT LONG-TERM CURRENT USE OF INSULIN (MULTI): ICD-10-CM

## 2024-05-30 DIAGNOSIS — E78.5 HYPERLIPIDEMIA, UNSPECIFIED HYPERLIPIDEMIA TYPE: ICD-10-CM

## 2024-05-30 DIAGNOSIS — I10 HYPERTENSION, UNSPECIFIED TYPE: ICD-10-CM

## 2024-05-30 PROCEDURE — 84443 ASSAY THYROID STIM HORMONE: CPT

## 2024-05-30 PROCEDURE — 82043 UR ALBUMIN QUANTITATIVE: CPT

## 2024-05-30 PROCEDURE — 36415 COLL VENOUS BLD VENIPUNCTURE: CPT

## 2024-05-30 PROCEDURE — 83036 HEMOGLOBIN GLYCOSYLATED A1C: CPT

## 2024-05-30 PROCEDURE — 80061 LIPID PANEL: CPT

## 2024-05-30 PROCEDURE — 80053 COMPREHEN METABOLIC PANEL: CPT

## 2024-05-30 PROCEDURE — 84439 ASSAY OF FREE THYROXINE: CPT

## 2024-05-30 PROCEDURE — 82570 ASSAY OF URINE CREATININE: CPT

## 2024-05-31 ENCOUNTER — OFFICE VISIT (OUTPATIENT)
Dept: PRIMARY CARE | Facility: CLINIC | Age: 64
End: 2024-05-31
Payer: COMMERCIAL

## 2024-05-31 VITALS
BODY MASS INDEX: 32.1 KG/M2 | WEIGHT: 187 LBS | DIASTOLIC BLOOD PRESSURE: 62 MMHG | OXYGEN SATURATION: 96 % | TEMPERATURE: 97.8 F | HEART RATE: 85 BPM | SYSTOLIC BLOOD PRESSURE: 124 MMHG

## 2024-05-31 DIAGNOSIS — E03.9 HYPOTHYROIDISM, UNSPECIFIED TYPE: ICD-10-CM

## 2024-05-31 DIAGNOSIS — I10 HYPERTENSION, UNSPECIFIED TYPE: ICD-10-CM

## 2024-05-31 DIAGNOSIS — E11.9 TYPE 2 DIABETES MELLITUS WITHOUT COMPLICATION, WITHOUT LONG-TERM CURRENT USE OF INSULIN (MULTI): Primary | ICD-10-CM

## 2024-05-31 DIAGNOSIS — E78.5 HYPERLIPIDEMIA, UNSPECIFIED HYPERLIPIDEMIA TYPE: ICD-10-CM

## 2024-05-31 DIAGNOSIS — T75.3XXS MOTION SICKNESS, SEQUELA: ICD-10-CM

## 2024-05-31 DIAGNOSIS — F41.9 ANXIETY: ICD-10-CM

## 2024-05-31 LAB
ALBUMIN SERPL BCP-MCNC: 4.5 G/DL (ref 3.4–5)
ALP SERPL-CCNC: 61 U/L (ref 33–136)
ALT SERPL W P-5'-P-CCNC: 13 U/L (ref 7–45)
ANION GAP SERPL CALC-SCNC: 14 MMOL/L (ref 10–20)
AST SERPL W P-5'-P-CCNC: 15 U/L (ref 9–39)
BILIRUB SERPL-MCNC: 0.4 MG/DL (ref 0–1.2)
BUN SERPL-MCNC: 16 MG/DL (ref 6–23)
CALCIUM SERPL-MCNC: 9.6 MG/DL (ref 8.6–10.6)
CHLORIDE SERPL-SCNC: 105 MMOL/L (ref 98–107)
CHOLEST SERPL-MCNC: 186 MG/DL (ref 0–199)
CHOLESTEROL/HDL RATIO: 3.9
CO2 SERPL-SCNC: 25 MMOL/L (ref 21–32)
CREAT SERPL-MCNC: 0.67 MG/DL (ref 0.5–1.05)
CREAT UR-MCNC: 184.7 MG/DL (ref 20–320)
EGFRCR SERPLBLD CKD-EPI 2021: >90 ML/MIN/1.73M*2
EST. AVERAGE GLUCOSE BLD GHB EST-MCNC: 114 MG/DL
GLUCOSE SERPL-MCNC: 90 MG/DL (ref 74–99)
HBA1C MFR BLD: 5.6 %
HDLC SERPL-MCNC: 47.4 MG/DL
LDLC SERPL CALC-MCNC: 108 MG/DL
MICROALBUMIN UR-MCNC: 31.2 MG/L
MICROALBUMIN/CREAT UR: 16.9 UG/MG CREAT
NON HDL CHOLESTEROL: 139 MG/DL (ref 0–149)
POTASSIUM SERPL-SCNC: 4.3 MMOL/L (ref 3.5–5.3)
PROT SERPL-MCNC: 7.2 G/DL (ref 6.4–8.2)
SODIUM SERPL-SCNC: 140 MMOL/L (ref 136–145)
T4 FREE SERPL-MCNC: 0.93 NG/DL (ref 0.78–1.48)
TRIGL SERPL-MCNC: 155 MG/DL (ref 0–149)
TSH SERPL-ACNC: 0.12 MIU/L (ref 0.44–3.98)
VLDL: 31 MG/DL (ref 0–40)

## 2024-05-31 PROCEDURE — 3060F POS MICROALBUMINURIA REV: CPT | Performed by: FAMILY MEDICINE

## 2024-05-31 PROCEDURE — 1036F TOBACCO NON-USER: CPT | Performed by: FAMILY MEDICINE

## 2024-05-31 PROCEDURE — 4010F ACE/ARB THERAPY RXD/TAKEN: CPT | Performed by: FAMILY MEDICINE

## 2024-05-31 PROCEDURE — 3074F SYST BP LT 130 MM HG: CPT | Performed by: FAMILY MEDICINE

## 2024-05-31 PROCEDURE — 3049F LDL-C 100-129 MG/DL: CPT | Performed by: FAMILY MEDICINE

## 2024-05-31 PROCEDURE — 3078F DIAST BP <80 MM HG: CPT | Performed by: FAMILY MEDICINE

## 2024-05-31 PROCEDURE — 3044F HG A1C LEVEL LT 7.0%: CPT | Performed by: FAMILY MEDICINE

## 2024-05-31 PROCEDURE — 99214 OFFICE O/P EST MOD 30 MIN: CPT | Performed by: FAMILY MEDICINE

## 2024-05-31 RX ORDER — OLMESARTAN MEDOXOMIL 40 MG/1
40 TABLET ORAL DAILY
Qty: 90 TABLET | Refills: 1 | Status: SHIPPED | OUTPATIENT
Start: 2024-05-31

## 2024-05-31 RX ORDER — SCOLOPAMINE TRANSDERMAL SYSTEM 1 MG/1
1 PATCH, EXTENDED RELEASE TRANSDERMAL
Qty: 10 PATCH | Refills: 0 | Status: SHIPPED | OUTPATIENT
Start: 2024-05-31

## 2024-05-31 RX ORDER — SERTRALINE HYDROCHLORIDE 100 MG/1
100 TABLET, FILM COATED ORAL DAILY
Qty: 90 TABLET | Refills: 1 | Status: SHIPPED | OUTPATIENT
Start: 2024-05-31

## 2024-05-31 RX ORDER — ROSUVASTATIN CALCIUM 40 MG/1
40 TABLET, COATED ORAL DAILY
Qty: 90 TABLET | Refills: 1 | Status: SHIPPED | OUTPATIENT
Start: 2024-05-31

## 2024-05-31 RX ORDER — METFORMIN HYDROCHLORIDE 500 MG/1
500 TABLET ORAL EVERY 12 HOURS
Qty: 180 TABLET | Refills: 1 | Status: SHIPPED | OUTPATIENT
Start: 2024-05-31

## 2024-05-31 RX ORDER — LEVOTHYROXINE SODIUM 150 UG/1
150 TABLET ORAL DAILY
Qty: 90 TABLET | Refills: 1 | Status: SHIPPED | OUTPATIENT
Start: 2024-05-31

## 2024-05-31 RX ORDER — ICOSAPENT ETHYL 1 G/1
2 CAPSULE ORAL
Qty: 360 CAPSULE | Refills: 1 | Status: SHIPPED | OUTPATIENT
Start: 2024-05-31

## 2024-05-31 RX ORDER — SCOLOPAMINE TRANSDERMAL SYSTEM 1 MG/1
PATCH, EXTENDED RELEASE TRANSDERMAL
COMMUNITY
End: 2024-05-31 | Stop reason: SDUPTHER

## 2024-05-31 NOTE — PROGRESS NOTES
Subjective   Patient ID: Mary العراقي is a 63 y.o. female who presents for Follow-up (EP. Here for follow up and medication.).  HPI  Feels well, no specific complaints or concerns today.    Working on weight loss - watching calorie intake    Has gyn doctor - had recent appt and mammogram     Has GI appt in late July   Review of Systems  General: no fever or night sweats, no change in weight  Eyes: no vision disturbance  ENT: no hearing loss, no hoarseness, no mouth lesions, no sore throat, and no dysphagia  CV: no chest pain, no palpitations, no lower extremity edema  Resp: no shortness of breath, no cough  GI: no abdominal pain, no change in bowel habits  : no urinary problems  MSK: no arthralgias, myalgias, or back pain  Skin; no rashes or new/changed skin lesions  Neuro: no headaches     Objective   /62   Pulse 85   Temp 36.6 °C (97.8 °F) (Oral)   Wt 84.8 kg (187 lb)   SpO2 96%   BMI 32.10 kg/m²    Physical Exam  Alert, well-appearing.    HEENT: OP clear. Sclera white. Pupils equal and round. EACs and TMs clear.    Neck: Supple. No palpable masses. Thyroid was not enlarged.    CVS: RRR, no murmurs. No peripheral edema.    Respiratory: Clear and equal breath sounds.    GI: Soft, nontender, no masses or hepatosplenomegaly.     Assessment/Plan   Diagnoses and all orders for this visit:  Type 2 diabetes mellitus without complication, without long-term current use of insulin (Multi)  -     metFORMIN (Glucophage) 500 mg tablet; Take 1 tablet (500 mg) by mouth every 12 hours. Take with food.  Anxiety  -     sertraline (Zoloft) 100 mg tablet; Take 1 tablet (100 mg) by mouth once daily.  Hypothyroidism, unspecified type  -     levothyroxine (Synthroid) 150 mcg tablet; Take 1 tablet (150 mcg) by mouth once daily.  Hyperlipidemia, unspecified hyperlipidemia type  -     icosapent ethyL (Vascepa) 1 gram capsule; Take 2 capsules (2 g) by mouth 2 times daily (morning and late afternoon).  -     rosuvastatin  (Crestor) 40 mg tablet; Take 1 tablet (40 mg) by mouth once daily.  Hypertension, unspecified type  -     olmesartan (BENIcar) 40 mg tablet; Take 1 tablet (40 mg) by mouth once daily.  Motion sickness, sequela  -     scopolamine (Transderm-Scop) 1 mg over 3 days patch 3 day; Place 1 patch on the skin every 3rd day.        Martha Thompson MD  Family Medicine   [facility]

## 2024-06-20 ENCOUNTER — APPOINTMENT (OUTPATIENT)
Dept: OPHTHALMOLOGY | Facility: CLINIC | Age: 64
End: 2024-06-20
Payer: COMMERCIAL

## 2024-06-20 DIAGNOSIS — H52.03 HYPEROPIA OF BOTH EYES WITH ASTIGMATISM AND PRESBYOPIA: ICD-10-CM

## 2024-06-20 DIAGNOSIS — H35.413 BILATERAL RETINAL LATTICE DEGENERATION: ICD-10-CM

## 2024-06-20 DIAGNOSIS — H52.203 HYPEROPIA OF BOTH EYES WITH ASTIGMATISM AND PRESBYOPIA: ICD-10-CM

## 2024-06-20 DIAGNOSIS — E11.9 DIABETES MELLITUS TYPE 2 WITHOUT RETINOPATHY (MULTI): Primary | ICD-10-CM

## 2024-06-20 DIAGNOSIS — H52.4 HYPEROPIA OF BOTH EYES WITH ASTIGMATISM AND PRESBYOPIA: ICD-10-CM

## 2024-06-20 PROCEDURE — 92004 COMPRE OPH EXAM NEW PT 1/>: CPT | Performed by: OPTOMETRIST

## 2024-06-20 PROCEDURE — 92015 DETERMINE REFRACTIVE STATE: CPT | Performed by: OPTOMETRIST

## 2024-06-20 ASSESSMENT — ENCOUNTER SYMPTOMS
NEUROLOGICAL NEGATIVE: 0
ALLERGIC/IMMUNOLOGIC NEGATIVE: 0
RESPIRATORY NEGATIVE: 0
CONSTITUTIONAL NEGATIVE: 0
EYES NEGATIVE: 1
HEMATOLOGIC/LYMPHATIC NEGATIVE: 0
ENDOCRINE NEGATIVE: 0
GASTROINTESTINAL NEGATIVE: 0
CARDIOVASCULAR NEGATIVE: 0
MUSCULOSKELETAL NEGATIVE: 0
PSYCHIATRIC NEGATIVE: 0

## 2024-06-20 ASSESSMENT — SLIT LAMP EXAM - LIDS
COMMENTS: GOOD POSITION
COMMENTS: GOOD POSITION

## 2024-06-20 ASSESSMENT — VISUAL ACUITY
OD_CC: 20/20
CORRECTION_TYPE: GLASSES
METHOD: SNELLEN - LINEAR
OS_BAT_MED: 20/25
OS_CC: 20/20
OD_BAT_MED: 20/25

## 2024-06-20 ASSESSMENT — CONF VISUAL FIELD
OD_SUPERIOR_NASAL_RESTRICTION: 0
OD_INFERIOR_TEMPORAL_RESTRICTION: 0
OS_INFERIOR_TEMPORAL_RESTRICTION: 0
OD_SUPERIOR_TEMPORAL_RESTRICTION: 0
OD_INFERIOR_NASAL_RESTRICTION: 0
OS_SUPERIOR_TEMPORAL_RESTRICTION: 0
OS_NORMAL: 1
OD_NORMAL: 1
OS_INFERIOR_NASAL_RESTRICTION: 0
OS_SUPERIOR_NASAL_RESTRICTION: 0

## 2024-06-20 ASSESSMENT — EXTERNAL EXAM - RIGHT EYE: OD_EXAM: NORMAL

## 2024-06-20 ASSESSMENT — REFRACTION_MANIFEST
OD_CYLINDER: -0.50
OD_ADD: +2.25
OS_ADD: +2.25
OS_CYLINDER: -0.25
OS_AXIS: 065
OD_SPHERE: +2.00
OD_AXIS: 065
OS_SPHERE: +1.50

## 2024-06-20 ASSESSMENT — EXTERNAL EXAM - LEFT EYE: OS_EXAM: NORMAL

## 2024-06-20 ASSESSMENT — REFRACTION_WEARINGRX
OS_AXIS: 065
OS_SPHERE: +1.50
OS_CYLINDER: -0.25
OD_CYLINDER: -0.50
OD_SPHERE: +1.75
OS_ADD: +2.25
SPECS_TYPE: PAL
OD_AXIS: 065
OD_ADD: +2.25

## 2024-06-20 ASSESSMENT — TONOMETRY
IOP_METHOD: GOLDMANN APPLANATION
OS_IOP_MMHG: 18
OD_IOP_MMHG: 19

## 2024-06-20 ASSESSMENT — CUP TO DISC RATIO
OS_RATIO: 0.3
OD_RATIO: 0.3

## 2024-06-20 NOTE — PROGRESS NOTES
DM2 and no retinopathy.   VA correctable to 20/20 OD/OS, BAT 20/25 OD/OS.  A spectacle prescription was dispensed to be used as needed.   Has tried DVO Cls and didn't like it.   Nuclear sclerosis OU.   Lattice degeneration OU. The patient was asked to return to our clinic or seek out eye care ASAP if new flashes of light or floaters are noted.    The patient was asked to return to our clinic in one year or sooner if ocular or vision changes occur.

## 2024-07-19 NOTE — PROGRESS NOTES
Subjective   Patient ID: Mary العراقي is a 63 y.o. female who presents for [facility] (Pt states that it was time for another double procedure. Reports last double 3+years ago at [facility]. Reports feeling very gassy, but denies other symptoms. ).  HPI  Patient is here to review previous upper endoscopy and colonoscopy  Last EGD and Colonoscopy in 2020.  These results are available in the  care  FH: No FH of colon cancer.     Nexium has controlled the reflux symptoms.     Colonoscopy was completely normal and repeat was recommended in 10 years.  PSH: None.     EGD    Current Outpatient Medications on File Prior to Visit   Medication Sig Dispense Refill    aspirin 81 mg EC tablet Take 1 tablet (81 mg) by mouth once daily. 90 tablet 3    esomeprazole (NexIUM) 40 mg DR capsule Take 1 capsule (40 mg) by mouth once daily. 90 capsule 1    icosapent ethyL (Vascepa) 1 gram capsule Take 2 capsules (2 g) by mouth 2 times daily (morning and late afternoon). 360 capsule 1    levothyroxine (Synthroid) 150 mcg tablet Take 1 tablet (150 mcg) by mouth once daily. 90 tablet 1    metFORMIN (Glucophage) 500 mg tablet Take 1 tablet (500 mg) by mouth every 12 hours. Take with food. 180 tablet 1    olmesartan (BENIcar) 40 mg tablet Take 1 tablet (40 mg) by mouth once daily. 90 tablet 1    rosuvastatin (Crestor) 40 mg tablet Take 1 tablet (40 mg) by mouth once daily. 90 tablet 1    sertraline (Zoloft) 100 mg tablet Take 1 tablet (100 mg) by mouth once daily. 90 tablet 1    scopolamine (Transderm-Scop) 1 mg over 3 days patch 3 day Place 1 patch on the skin every 3rd day. 10 patch 0     No current facility-administered medications on file prior to visit.        Review of Systems   Constitutional:  Negative for chills, fever and unexpected weight change.   HENT:  Negative for congestion and trouble swallowing.    Respiratory:  Negative for cough, shortness of breath and wheezing.    Cardiovascular:  Negative for chest pain.    Gastrointestinal:  Negative for abdominal distention, abdominal pain, constipation, diarrhea, nausea and vomiting.   Genitourinary:  Negative for difficulty urinating.   Musculoskeletal:  Negative for arthralgias and joint swelling.   Skin:  Negative for color change.   Neurological:  Negative for dizziness, speech difficulty, light-headedness and headaches.   Psychiatric/Behavioral:  Negative for confusion and sleep disturbance.        Objective   Physical Exam  Constitutional:       General: She is awake.      Appearance: Normal appearance.   HENT:      Head: Normocephalic and atraumatic.      Nose: Nose normal.      Mouth/Throat:      Mouth: Mucous membranes are moist.   Eyes:      Pupils: Pupils are equal, round, and reactive to light.   Neck:      Thyroid: No thyroid mass.      Trachea: Phonation normal.   Cardiovascular:      Rate and Rhythm: Normal rate and regular rhythm.      Heart sounds: Normal heart sounds. No murmur heard.     No gallop.   Pulmonary:      Effort: Pulmonary effort is normal. No respiratory distress.      Breath sounds: Normal air entry. No decreased breath sounds, wheezing, rhonchi or rales.   Abdominal:      General: Bowel sounds are normal. There is no distension.      Palpations: Abdomen is soft.      Tenderness: There is no abdominal tenderness.   Musculoskeletal:      Cervical back: Neck supple.      Right lower leg: No edema.      Left lower leg: No edema.   Skin:     General: Skin is warm.      Capillary Refill: Capillary refill takes less than 2 seconds.   Neurological:      General: No focal deficit present.      Mental Status: She is alert and oriented to person, place, and time. Mental status is at baseline.      Cranial Nerves: Cranial nerves 2-12 are intact.      Motor: Motor function is intact.   Psychiatric:         Attention and Perception: Attention and perception normal.         Mood and Affect: Mood normal.         Speech: Speech normal.         Behavior: Behavior  "normal.       BP (!) 174/98 (BP Location: Right arm, Patient Position: Sitting, BP Cuff Size: Large adult)   Pulse 74   Resp 18   Ht 1.626 m (5' 4\")   Wt 88.7 kg (195 lb 9.6 oz)   SpO2 99%   BMI 33.57 kg/m²      Lab Results   Component Value Date    WBC 7.9 10/19/2019    HGB 12.1 10/19/2019    HCT 37.9 10/19/2019    MCV 91 10/19/2019     10/19/2019           No lab exists for component: \"LABALBU\"    No results found for: \"AFP\"  Lab Results   Component Value Date    TSH 0.12 (L) 05/30/2024         Assessment/Plan   Diagnoses and all orders for this visit:  Gastroesophageal reflux disease without esophagitis  You will be due for colonoscopy in 2030.  No need for upper endoscopy based on your previous upper endoscopy results which were normal in 2020.  You will continue Nexium 40 mg total daily.  Follow lifestyle modifications.  Follow-up in my office as needed.         "

## 2024-07-22 ENCOUNTER — APPOINTMENT (OUTPATIENT)
Dept: GASTROENTEROLOGY | Facility: CLINIC | Age: 64
End: 2024-07-22
Payer: COMMERCIAL

## 2024-07-22 VITALS
SYSTOLIC BLOOD PRESSURE: 174 MMHG | OXYGEN SATURATION: 99 % | WEIGHT: 195.6 LBS | DIASTOLIC BLOOD PRESSURE: 98 MMHG | HEART RATE: 74 BPM | HEIGHT: 64 IN | RESPIRATION RATE: 18 BRPM | BODY MASS INDEX: 33.39 KG/M2

## 2024-07-22 DIAGNOSIS — K21.9 GASTROESOPHAGEAL REFLUX DISEASE WITHOUT ESOPHAGITIS: Primary | ICD-10-CM

## 2024-07-22 PROCEDURE — 4010F ACE/ARB THERAPY RXD/TAKEN: CPT | Performed by: INTERNAL MEDICINE

## 2024-07-22 PROCEDURE — 3080F DIAST BP >= 90 MM HG: CPT | Performed by: INTERNAL MEDICINE

## 2024-07-22 PROCEDURE — 3044F HG A1C LEVEL LT 7.0%: CPT | Performed by: INTERNAL MEDICINE

## 2024-07-22 PROCEDURE — 3008F BODY MASS INDEX DOCD: CPT | Performed by: INTERNAL MEDICINE

## 2024-07-22 PROCEDURE — 99204 OFFICE O/P NEW MOD 45 MIN: CPT | Performed by: INTERNAL MEDICINE

## 2024-07-22 PROCEDURE — 3060F POS MICROALBUMINURIA REV: CPT | Performed by: INTERNAL MEDICINE

## 2024-07-22 PROCEDURE — 3077F SYST BP >= 140 MM HG: CPT | Performed by: INTERNAL MEDICINE

## 2024-07-22 PROCEDURE — 3049F LDL-C 100-129 MG/DL: CPT | Performed by: INTERNAL MEDICINE

## 2024-07-22 NOTE — PATIENT INSTRUCTIONS
You will be due for colonoscopy in 2030.  No need for upper endoscopy based on your previous upper endoscopy results which were normal in 2020.  You will continue Nexium 40 mg total daily.  Follow lifestyle modifications.  Follow-up in my office as needed.

## 2024-07-29 ASSESSMENT — ENCOUNTER SYMPTOMS
FEVER: 0
DIFFICULTY URINATING: 0
HEADACHES: 0
COUGH: 0
CONSTIPATION: 0
LIGHT-HEADEDNESS: 0
JOINT SWELLING: 0
NAUSEA: 0
CONFUSION: 0
CHILLS: 0
ARTHRALGIAS: 0
ABDOMINAL DISTENTION: 0
DIARRHEA: 0
SLEEP DISTURBANCE: 0
WHEEZING: 0
TROUBLE SWALLOWING: 0
SHORTNESS OF BREATH: 0
VOMITING: 0
SPEECH DIFFICULTY: 0
UNEXPECTED WEIGHT CHANGE: 0
DIZZINESS: 0
COLOR CHANGE: 0
ABDOMINAL PAIN: 0

## 2024-08-14 NOTE — PROGRESS NOTES
It was a pleasure to see Ms. العراقي at the Neurosurgery Spine Clinic at [facility].     She is a really nice 64 y.o. female  who presents to us with complaints primarily axial NECK pain  that started about  3 months ago, and have been rapidly worsening since that time.  The symptoms started after no known injury    The pain is 10 /10. The pain is described as aching and tingling and occurs all day.  Symptoms are exacerbated by nothing in particular. Factors which relieve the pain include sleep  Has tightness in the right thigh.    Numbness and/or tingling - YES    Weakness : NO    Bladder/Bowel symptoms - NO    The patient has tried medications (eg:  NSAIDS ) : Yes  PT : No  Pain Management with ESIs/selective nerve blocks  - NO    she is a NON-SMOKER and NON-DIABETIC    History of Depression : YES    PRIOR SPINE SURGERY: YES had lumbar discectomy with Dr. Madhu Stanley    Denies use of Aspirin, Coumadin, or Plavix or any other anticoagulants     NARCOTICS for pain : NO    Part of this patient’s history is from personal review of the patient’s previous charts.      Past Medical History:   Diagnosis Date    Acute pharyngitis, unspecified 12/03/2013    Sore throat    Acute upper respiratory infection, unspecified 12/03/2013    Acute upper respiratory infection    Personal history of other diseases of the digestive system     History of gastroesophageal reflux (GERD)    Personal history of other diseases of the nervous system and sense organs 12/03/2013    History of earache    Personal history of other diseases of the respiratory system 08/09/2018    History of sinusitis    Personal history of other diseases of the respiratory system 02/25/2017    History of acute bronchitis    Personal history of other endocrine, nutritional and metabolic disease     History of hypothyroidism    Personal history of other endocrine, nutritional and metabolic disease     History of hyperglycemia           Current Outpatient  Medications:     aspirin 81 mg EC tablet, Take 1 tablet (81 mg) by mouth once daily., Disp: 90 tablet, Rfl: 3    esomeprazole (NexIUM) 40 mg DR capsule, Take 1 capsule (40 mg) by mouth once daily., Disp: 90 capsule, Rfl: 1    icosapent ethyL (Vascepa) 1 gram capsule, Take 2 capsules (2 g) by mouth 2 times daily (morning and late afternoon)., Disp: 360 capsule, Rfl: 1    levothyroxine (Synthroid) 150 mcg tablet, Take 1 tablet (150 mcg) by mouth once daily., Disp: 90 tablet, Rfl: 1    metFORMIN (Glucophage) 500 mg tablet, Take 1 tablet (500 mg) by mouth every 12 hours. Take with food., Disp: 180 tablet, Rfl: 1    olmesartan (BENIcar) 40 mg tablet, Take 1 tablet (40 mg) by mouth once daily., Disp: 90 tablet, Rfl: 1    rosuvastatin (Crestor) 40 mg tablet, Take 1 tablet (40 mg) by mouth once daily., Disp: 90 tablet, Rfl: 1    scopolamine (Transderm-Scop) 1 mg over 3 days patch 3 day, Place 1 patch on the skin every 3rd day., Disp: 10 patch, Rfl: 0    sertraline (Zoloft) 100 mg tablet, Take 1 tablet (100 mg) by mouth once daily., Disp: 90 tablet, Rfl: 1      Review of Systems :   Constitutional: No fever or chills  Respiratory: No shortness of breath or wheezing  Musculoskeletal: see HPI above   Neurologic: See HPI above      EXAM:   There were no vitals filed for this visit.  NEURO: Neurologically patient is awake alert and oriented X 3    No obvious cranial nerve deficit.  Strength is almost 5/5 throughout all motor groups tested with no asymmetric significant motor deficit.  Deep tendon reflexes are symmetric throughout.   Gait : WNL   Sensory examination is intact to touch and painful stimuli.  Well-healed incision present in the lower back from the previous lumbar spine surgery.    IMAGING:   No imaging for the neck to review.  MRI of the lumbar spine done in 2022 does not show any evidence of significant stenosis with evidence of previous laminectomy changes.    ASSESSMENT AND PLAN:  Ms. العراقي presents to me with  axial neck pain and muscle spasm that has been going on for few weeks or so.  She has had prior history of a lumbar laminectomy that was done at an outside hospital about 6 to 8 years back.    She does not have any numbness tingling or any signs or symptoms of cervical radiculopathy.  She has not tried any nonoperative treatment for her neck pain except the Motrin that helps her to some extent.    Her neurological examination is completely benign with absence of any focal neurological deficits.  The duration of symptoms < 6 weeks and the patient does NOT have any symptoms or history of severe or progressive neurological deficit (eg, bowel or bladder function, saddle parasthesia),  and there is no urgent indication to obtain any advanced imaging. I have given her a referral for PT and PAIN management.   I will be happy to see her back if she continues to be symptomatic despite all conservative treatment.      Eze De La O MD, [facility]   of Neurological Surgery  [facility] [employer]  Attending Surgeon  Director - Minimally Invasive Spine Surgery  [place], [place]      Some of this note was completed using Dragon voice recognition technology and sometimes the software misinterprets words. This may include unintended errors with respect to translation of words, typographical errors or grammar errors which may not have been identified prior to finalization of the chart note. Please take this into account when reading this note

## 2024-08-15 ENCOUNTER — OFFICE VISIT (OUTPATIENT)
Dept: NEUROSURGERY | Facility: CLINIC | Age: 64
End: 2024-08-15
Payer: COMMERCIAL

## 2024-08-15 VITALS
BODY MASS INDEX: 32.44 KG/M2 | HEIGHT: 64 IN | DIASTOLIC BLOOD PRESSURE: 83 MMHG | RESPIRATION RATE: 18 BRPM | HEART RATE: 70 BPM | WEIGHT: 190 LBS | SYSTOLIC BLOOD PRESSURE: 145 MMHG

## 2024-08-15 DIAGNOSIS — M54.2 NECK PAIN: ICD-10-CM

## 2024-08-15 PROCEDURE — 3044F HG A1C LEVEL LT 7.0%: CPT | Performed by: NEUROLOGICAL SURGERY

## 2024-08-15 PROCEDURE — 99203 OFFICE O/P NEW LOW 30 MIN: CPT | Performed by: NEUROLOGICAL SURGERY

## 2024-08-15 PROCEDURE — 1036F TOBACCO NON-USER: CPT | Performed by: NEUROLOGICAL SURGERY

## 2024-08-15 PROCEDURE — 3060F POS MICROALBUMINURIA REV: CPT | Performed by: NEUROLOGICAL SURGERY

## 2024-08-15 PROCEDURE — 4010F ACE/ARB THERAPY RXD/TAKEN: CPT | Performed by: NEUROLOGICAL SURGERY

## 2024-08-15 PROCEDURE — 3008F BODY MASS INDEX DOCD: CPT | Performed by: NEUROLOGICAL SURGERY

## 2024-08-15 PROCEDURE — 3079F DIAST BP 80-89 MM HG: CPT | Performed by: NEUROLOGICAL SURGERY

## 2024-08-15 PROCEDURE — 3077F SYST BP >= 140 MM HG: CPT | Performed by: NEUROLOGICAL SURGERY

## 2024-08-15 PROCEDURE — 3049F LDL-C 100-129 MG/DL: CPT | Performed by: NEUROLOGICAL SURGERY

## 2024-08-15 ASSESSMENT — PAIN SCALES - GENERAL: PAINLEVEL: 4

## 2024-09-16 ENCOUNTER — APPOINTMENT (OUTPATIENT)
Dept: PAIN MEDICINE | Facility: CLINIC | Age: 64
End: 2024-09-16
Payer: COMMERCIAL

## 2024-10-07 DIAGNOSIS — I10 HYPERTENSION, UNSPECIFIED TYPE: ICD-10-CM

## 2024-10-07 DIAGNOSIS — E11.9 TYPE 2 DIABETES MELLITUS WITHOUT COMPLICATION, WITHOUT LONG-TERM CURRENT USE OF INSULIN (MULTI): ICD-10-CM

## 2024-10-07 DIAGNOSIS — E03.9 HYPOTHYROIDISM, UNSPECIFIED TYPE: ICD-10-CM

## 2024-10-07 DIAGNOSIS — E78.5 HYPERLIPIDEMIA, UNSPECIFIED HYPERLIPIDEMIA TYPE: ICD-10-CM

## 2024-10-07 DIAGNOSIS — Z00.00 ANNUAL PHYSICAL EXAM: ICD-10-CM

## 2024-10-21 ENCOUNTER — APPOINTMENT (OUTPATIENT)
Dept: PRIMARY CARE | Facility: CLINIC | Age: 64
End: 2024-10-21
Payer: COMMERCIAL

## 2024-10-23 ENCOUNTER — APPOINTMENT (OUTPATIENT)
Dept: OPHTHALMOLOGY | Facility: CLINIC | Age: 64
End: 2024-10-23
Payer: COMMERCIAL

## 2024-11-01 ENCOUNTER — APPOINTMENT (OUTPATIENT)
Dept: PRIMARY CARE | Facility: CLINIC | Age: 64
End: 2024-11-01
Payer: COMMERCIAL

## 2024-11-25 DIAGNOSIS — E11.9 TYPE 2 DIABETES MELLITUS WITHOUT COMPLICATION, WITHOUT LONG-TERM CURRENT USE OF INSULIN (MULTI): ICD-10-CM

## 2024-11-25 RX ORDER — METFORMIN HYDROCHLORIDE 500 MG/1
500 TABLET ORAL EVERY 12 HOURS
Qty: 60 TABLET | Refills: 0 | Status: SHIPPED | OUTPATIENT
Start: 2024-11-25

## 2024-11-25 NOTE — TELEPHONE ENCOUNTER
Spoke w/patient, needs refill on  Metformin 500 mg - 10 pills left  Send to [facility]  Last appt. 5/31/24  Upcoming appt. 12/31/24  Last ADELINE 5/30/24    *Cancelled last 2 appts.*

## 2024-12-30 ENCOUNTER — LAB (OUTPATIENT)
Dept: LAB | Facility: LAB | Age: 64
End: 2024-12-30
Payer: COMMERCIAL

## 2024-12-30 DIAGNOSIS — E03.9 HYPOTHYROIDISM, UNSPECIFIED TYPE: ICD-10-CM

## 2024-12-30 DIAGNOSIS — E78.5 HYPERLIPIDEMIA, UNSPECIFIED HYPERLIPIDEMIA TYPE: ICD-10-CM

## 2024-12-30 DIAGNOSIS — I10 HYPERTENSION, UNSPECIFIED TYPE: ICD-10-CM

## 2024-12-30 DIAGNOSIS — E11.9 TYPE 2 DIABETES MELLITUS WITHOUT COMPLICATION, WITHOUT LONG-TERM CURRENT USE OF INSULIN (MULTI): ICD-10-CM

## 2024-12-30 DIAGNOSIS — Z00.00 ANNUAL PHYSICAL EXAM: ICD-10-CM

## 2024-12-30 LAB
ALBUMIN SERPL BCP-MCNC: 4.4 G/DL (ref 3.4–5)
ALP SERPL-CCNC: 76 U/L (ref 33–136)
ALT SERPL W P-5'-P-CCNC: 18 U/L (ref 7–45)
ANION GAP SERPL CALC-SCNC: 13 MMOL/L (ref 10–20)
AST SERPL W P-5'-P-CCNC: 25 U/L (ref 9–39)
BASOPHILS # BLD AUTO: 0.07 X10*3/UL (ref 0–0.1)
BASOPHILS NFR BLD AUTO: 1 %
BILIRUB SERPL-MCNC: 0.3 MG/DL (ref 0–1.2)
BUN SERPL-MCNC: 14 MG/DL (ref 6–23)
CALCIUM SERPL-MCNC: 9.4 MG/DL (ref 8.6–10.6)
CHLORIDE SERPL-SCNC: 105 MMOL/L (ref 98–107)
CHOLEST SERPL-MCNC: 250 MG/DL (ref 0–199)
CHOLESTEROL/HDL RATIO: 4.4
CO2 SERPL-SCNC: 25 MMOL/L (ref 21–32)
CREAT SERPL-MCNC: 0.77 MG/DL (ref 0.5–1.05)
EGFRCR SERPLBLD CKD-EPI 2021: 86 ML/MIN/1.73M*2
EOSINOPHIL # BLD AUTO: 0.17 X10*3/UL (ref 0–0.7)
EOSINOPHIL NFR BLD AUTO: 2.5 %
ERYTHROCYTE [DISTWIDTH] IN BLOOD BY AUTOMATED COUNT: 13.6 % (ref 11.5–14.5)
EST. AVERAGE GLUCOSE BLD GHB EST-MCNC: 120 MG/DL
GLUCOSE SERPL-MCNC: 96 MG/DL (ref 74–99)
HBA1C MFR BLD: 5.8 %
HCT VFR BLD AUTO: 35.9 % (ref 36–46)
HDLC SERPL-MCNC: 56.4 MG/DL
HGB BLD-MCNC: 11.8 G/DL (ref 12–16)
IMM GRANULOCYTES # BLD AUTO: 0.03 X10*3/UL (ref 0–0.7)
IMM GRANULOCYTES NFR BLD AUTO: 0.4 % (ref 0–0.9)
LDLC SERPL CALC-MCNC: 142 MG/DL
LYMPHOCYTES # BLD AUTO: 2.01 X10*3/UL (ref 1.2–4.8)
LYMPHOCYTES NFR BLD AUTO: 29.5 %
MCH RBC QN AUTO: 29.4 PG (ref 26–34)
MCHC RBC AUTO-ENTMCNC: 32.9 G/DL (ref 32–36)
MCV RBC AUTO: 90 FL (ref 80–100)
MONOCYTES # BLD AUTO: 0.47 X10*3/UL (ref 0.1–1)
MONOCYTES NFR BLD AUTO: 6.9 %
NEUTROPHILS # BLD AUTO: 4.06 X10*3/UL (ref 1.2–7.7)
NEUTROPHILS NFR BLD AUTO: 59.7 %
NON HDL CHOLESTEROL: 194 MG/DL (ref 0–149)
NRBC BLD-RTO: 0 /100 WBCS (ref 0–0)
PLATELET # BLD AUTO: 321 X10*3/UL (ref 150–450)
POTASSIUM SERPL-SCNC: 4.2 MMOL/L (ref 3.5–5.3)
PROT SERPL-MCNC: 7.4 G/DL (ref 6.4–8.2)
RBC # BLD AUTO: 4.01 X10*6/UL (ref 4–5.2)
SODIUM SERPL-SCNC: 139 MMOL/L (ref 136–145)
T4 FREE SERPL-MCNC: 1.19 NG/DL (ref 0.78–1.48)
TRIGL SERPL-MCNC: 258 MG/DL (ref 0–149)
TSH SERPL-ACNC: 2.21 MIU/L (ref 0.44–3.98)
VLDL: 52 MG/DL (ref 0–40)
WBC # BLD AUTO: 6.8 X10*3/UL (ref 4.4–11.3)

## 2024-12-30 PROCEDURE — 83036 HEMOGLOBIN GLYCOSYLATED A1C: CPT

## 2024-12-30 PROCEDURE — 85025 COMPLETE CBC W/AUTO DIFF WBC: CPT

## 2024-12-30 PROCEDURE — 80061 LIPID PANEL: CPT

## 2024-12-30 PROCEDURE — 84439 ASSAY OF FREE THYROXINE: CPT

## 2024-12-30 PROCEDURE — 80053 COMPREHEN METABOLIC PANEL: CPT

## 2024-12-30 PROCEDURE — 84443 ASSAY THYROID STIM HORMONE: CPT

## 2024-12-31 ENCOUNTER — APPOINTMENT (OUTPATIENT)
Dept: PRIMARY CARE | Facility: CLINIC | Age: 64
End: 2024-12-31
Payer: COMMERCIAL

## 2024-12-31 VITALS
HEART RATE: 73 BPM | BODY MASS INDEX: 34.5 KG/M2 | OXYGEN SATURATION: 97 % | DIASTOLIC BLOOD PRESSURE: 64 MMHG | WEIGHT: 201 LBS | SYSTOLIC BLOOD PRESSURE: 124 MMHG | TEMPERATURE: 97.5 F

## 2024-12-31 DIAGNOSIS — I10 PRIMARY HYPERTENSION: ICD-10-CM

## 2024-12-31 DIAGNOSIS — E11.9 TYPE 2 DIABETES MELLITUS WITHOUT COMPLICATION, WITHOUT LONG-TERM CURRENT USE OF INSULIN (MULTI): Primary | ICD-10-CM

## 2024-12-31 DIAGNOSIS — K21.9 GASTROESOPHAGEAL REFLUX DISEASE WITHOUT ESOPHAGITIS: ICD-10-CM

## 2024-12-31 DIAGNOSIS — E78.5 HYPERLIPIDEMIA, UNSPECIFIED HYPERLIPIDEMIA TYPE: ICD-10-CM

## 2024-12-31 DIAGNOSIS — M54.2 NECK PAIN: ICD-10-CM

## 2024-12-31 DIAGNOSIS — F41.9 ANXIETY: ICD-10-CM

## 2024-12-31 DIAGNOSIS — E03.9 ACQUIRED HYPOTHYROIDISM: ICD-10-CM

## 2024-12-31 PROCEDURE — 3074F SYST BP LT 130 MM HG: CPT | Performed by: FAMILY MEDICINE

## 2024-12-31 PROCEDURE — 3078F DIAST BP <80 MM HG: CPT | Performed by: FAMILY MEDICINE

## 2024-12-31 PROCEDURE — 99214 OFFICE O/P EST MOD 30 MIN: CPT | Performed by: FAMILY MEDICINE

## 2024-12-31 PROCEDURE — 4010F ACE/ARB THERAPY RXD/TAKEN: CPT | Performed by: FAMILY MEDICINE

## 2024-12-31 NOTE — PROGRESS NOTES
Subjective   Patient ID: Mary العراقي is a 64 y.o. female who presents for Follow-up (EP. Here for follow up on blood work and meds.).  HPI  Feels well, no specific complaints or concerns today except for neck pain- she would like to see specialist, does not want surgery     Forgets to take her evening meds (which includes rosuvastatin)  Review of Systems  Genl: The patient has been in good health without recent weight change, fevers, or night sweats  CVS: No chest pain, irregular heartbeat, shortness of breath, or swollen ankles  Resp: No cough or difficulty breathing  GI: No change in bowel habits or abdominal pain. No heartburn  : No urinary problems.   Objective   /64 (BP Location: Left arm)   Pulse 73   Temp 36.4 °C (97.5 °F) (Oral)   Wt 91.2 kg (201 lb)   SpO2 97%   BMI 34.50 kg/m²    Physical Exam  Alert, well-appearing.    CVS: RRR, no murmurs.     Respiratory: Clear and equal breath sounds.    GI: Soft, nontender, no masses or hepatosplenomegaly.     Assessment/Plan   Diagnoses and all orders for this visit:  Type 2 diabetes mellitus without complication, without long-term current use of insulin (Multi)  -     Hemoglobin A1C; Future  -     Comprehensive Metabolic Panel; Future  Neck pain  -     Referral to Medical Spine; Future  Hyperlipidemia, unspecified hyperlipidemia type  -     Lipid Panel; Future  Primary hypertension  Acquired hypothyroidism  Gastroesophageal reflux disease without esophagitis  Anxiety      Discussed lifestyle modification with diet and exercise  Take meds as prescribed   Follow up 3 mos      Martha Thompson MD  Family Medicine   [facility]   Breath sounds clear and equal bilaterally.

## 2025-02-04 ENCOUNTER — APPOINTMENT (OUTPATIENT)
Dept: ORTHOPEDIC SURGERY | Facility: CLINIC | Age: 65
End: 2025-02-04
Payer: COMMERCIAL

## 2025-02-04 ENCOUNTER — APPOINTMENT (OUTPATIENT)
Dept: NEUROSURGERY | Facility: CLINIC | Age: 65
End: 2025-02-04
Payer: COMMERCIAL

## 2025-02-04 VITALS — WEIGHT: 199 LBS | TEMPERATURE: 96.8 F | HEIGHT: 64 IN | BODY MASS INDEX: 33.97 KG/M2

## 2025-02-04 DIAGNOSIS — M54.12 CERVICAL RADICULOPATHY: Primary | ICD-10-CM

## 2025-02-04 DIAGNOSIS — M54.2 NECK PAIN: ICD-10-CM

## 2025-02-04 PROCEDURE — 99214 OFFICE O/P EST MOD 30 MIN: CPT | Performed by: PHYSICIAN ASSISTANT

## 2025-02-04 PROCEDURE — 4010F ACE/ARB THERAPY RXD/TAKEN: CPT | Performed by: PHYSICIAN ASSISTANT

## 2025-02-04 PROCEDURE — 1036F TOBACCO NON-USER: CPT | Performed by: PHYSICIAN ASSISTANT

## 2025-02-04 PROCEDURE — 3008F BODY MASS INDEX DOCD: CPT | Performed by: PHYSICIAN ASSISTANT

## 2025-02-04 RX ORDER — MELOXICAM 15 MG/1
15 TABLET ORAL DAILY
Qty: 30 TABLET | Refills: 0 | Status: SHIPPED | OUTPATIENT
Start: 2025-02-04 | End: 2025-03-06

## 2025-02-04 ASSESSMENT — PAIN SCALES - GENERAL: PAINLEVEL_OUTOF10: 3

## 2025-02-04 NOTE — PROGRESS NOTES
[facility] [place]  Department of Neurological Surgery  New Patient Visit    History of Present Illness:  Mary العراقي is a 64 y.o. year old female who presents to the spine clinic with right upper extremity paresthesia.  This been ongoing for 4 to 5 months and she had seen Dr. De La O previously for symptoms.  She denies any inciting incident leading onset of her symptoms and states that they have gradually developed.  She previously seen chiropractor with significant benefit for neck pain though currently describes internal numbness and tingling through her right shoulder continuing to her biceps and forearm.  It is worsened with use of her arm especially as she has taken up PNO lately.  It is improved with rest.  She denies arm pain, weakness, or coordination loss.  Will take Motrin and Tylenol for improvement of her symptoms and apply ice and heat as necessary.  Left arm unaffected.    Prior Spine Surgeries: lumbar decompression    Physical Therapy: no  Diabetic: preon metformin  Osteoporosis: no  Patient's BMI is Body mass index is 34.16 kg/m².    14/14 systems reviewed and negative other than what is listed in the history of present illness    Patient Active Problem List   Diagnosis    Abnormal stress test    Acute maxillary sinusitis    Anxiety    Arthritis    Atrophic vaginitis    Lumbar radicular pain    Back pain    Bilateral knee pain    Breast tenderness    Asthmatic bronchitis (HHS-HCC)    Bronchitis, acute    Bug bite    Combined form of senile cataract of both eyes    Dyspnea    Exertional shortness of breath    Dysuria    Folliculitis    GERD (gastroesophageal reflux disease)    Headache    Hip pain, right    Hyperlipidemia    Hyperopia of both eyes with astigmatism and presbyopia    Hypertension    Hypothyroidism    Thyroid trouble    Meralgia paresthetica of right side    Motion sickness    Myopia with presbyopia of both eyes    Numbness and tingling of hand    Other  intervertebral disc degeneration, lumbar region    Right knee pain    Sinusitis    Status post total replacement of right hip    Tinnitus    Diabetes mellitus type 2 without retinopathy (Multi)    Viral infection    Bilateral retinal lattice degeneration     Past Medical History:   Diagnosis Date    Acute pharyngitis, unspecified 12/03/2013    Sore throat    Acute upper respiratory infection, unspecified 12/03/2013    Acute upper respiratory infection    Personal history of other diseases of the digestive system     History of gastroesophageal reflux (GERD)    Personal history of other diseases of the nervous system and sense organs 12/03/2013    History of earache    Personal history of other diseases of the respiratory system 08/09/2018    History of sinusitis    Personal history of other diseases of the respiratory system 02/25/2017    History of acute bronchitis    Personal history of other endocrine, nutritional and metabolic disease     History of hypothyroidism    Personal history of other endocrine, nutritional and metabolic disease     History of hyperglycemia     Past Surgical History:   Procedure Laterality Date    CT ANGIO CORONARY ART WITH HEARTFLOW IF SCORE >30%  11/16/2021    CT HEART CORONARY ANGIOGRAM 11/16/2021 [facility]    OTHER SURGICAL HISTORY  10/06/2021    Back surgery    OTHER SURGICAL HISTORY  10/06/2021    Rotator cuff repair    OTHER SURGICAL HISTORY  10/06/2021    Hip replacement     Social History     Tobacco Use    Smoking status: Never     Passive exposure: Never    Smokeless tobacco: Never   Substance Use Topics    Alcohol use: Yes     Comment: MODERATELY     family history includes Anemia in her mother; Arthritis in her mother; Diabetes in her mother; Heart disease in her father and mother; Hyperlipidemia in her father and mother; Hypertension in her father and mother; Kidney disease in her mother.    Current Outpatient Medications:     aspirin 81 mg EC tablet, Take 1  tablet (81 mg) by mouth once daily., Disp: 90 tablet, Rfl: 3    esomeprazole (NexIUM) 40 mg DR capsule, Take 1 capsule (40 mg) by mouth once daily., Disp: 90 capsule, Rfl: 1    icosapent ethyL (Vascepa) 1 gram capsule, Take 2 capsules (2 g) by mouth 2 times daily (morning and late afternoon)., Disp: 360 capsule, Rfl: 1    levothyroxine (Synthroid) 150 mcg tablet, Take 1 tablet (150 mcg) by mouth once daily., Disp: 90 tablet, Rfl: 1    meloxicam (Mobic) 15 mg tablet, Take 1 tablet (15 mg) by mouth once daily., Disp: 30 tablet, Rfl: 0    metFORMIN (Glucophage) 500 mg tablet, Take 1 tablet (500 mg) by mouth every 12 hours. Take with food., Disp: 60 tablet, Rfl: 0    olmesartan (BENIcar) 40 mg tablet, Take 1 tablet (40 mg) by mouth once daily., Disp: 90 tablet, Rfl: 1    rosuvastatin (Crestor) 40 mg tablet, Take 1 tablet (40 mg) by mouth once daily., Disp: 90 tablet, Rfl: 1    scopolamine (Transderm-Scop) 1 mg over 3 days patch 3 day, Place 1 patch on the skin every 3rd day., Disp: 10 patch, Rfl: 0    sertraline (Zoloft) 100 mg tablet, Take 1 tablet (100 mg) by mouth once daily., Disp: 90 tablet, Rfl: 1  Allergies   Allergen Reactions    Sulfa Dyne Hives    Sulfa (Sulfonamide Antibiotics) Unknown       Physical Examination    General: Well developed, awake/alert/oriented x3, no distress, alert and cooperative  Skin: Warm and dry, no lesions, no rashes  ENMT: Mucous membranes moist, no apparent injury, no lesions seen  Head/Neck: Neck Supple, no apparent injury  Respiratory/Thorax: Normal breath sounds with good chest expansion, thorax symmetric  Cardiovascular: No pitting edema, no JVD    Motor Strength: 5/5 Throughout all extremities    Muscle Bulk: Normal and symmetric in all extremities    Posture:   -- Cervical: Normal  -- Thoracic: Normal  -- Lumbar : Normal  Paraspinal muscle spasm/tenderness absent.   Midline tenderness absent    Sensation: intact to light touch  Spurling increases paresthesia right  arm    Assessment and Plan:  Mary العراقي is a 64 y.o. year old female who presents to the spine clinic with right upper extremity paresthesia.  This been ongoing for 4 to 5 months and she had seen Dr. De La O previously for symptoms.  She denies any inciting incident leading onset of her symptoms and states that they have gradually developed.  She previously seen chiropractor with significant benefit for neck pain though currently describes internal numbness and tingling through her right shoulder continuing to her biceps and forearm.  It is worsened with use of her arm especially as she has taken up PNO lately.  It is improved with rest.  She denies arm pain, weakness, or coordination loss.  Will take Motrin and Tylenol for improvement of her symptoms and apply ice and heat as necessary.  Left arm unaffected.    Recommend x-rays with flexion-extension for overall hemodynamic stability evaluation.  Also provide referral to physical therapy for strengthening and stretching exercises to offload her axial spine.  Prescription for meloxicam sent to pharmacy to utilize with Tylenol for symptom relief.  If symptoms continue would recommend MRI at that time.      I have reviewed all prior documentation and reviewed the electronic medical record since admission. I have personally have reviewed all advanced imaging not just the reports and used my interpretation as documented as the relevant findings. I have reviewed the risks and benefits of all treatment recommendations listed in this note with the patient and family.       The above clinical summary has been dictated with voice recognition software. It has not been proofread for grammatical errors, typographical mistakes, or other semantic inconsistencies.    Thank you for visiting our office today. It was our pleasure to take part in your healthcare.     Do not hesitate to call with any questions regarding your plan of care after leaving at [phone] M-F 8am-4pm.      To clinicians, thank you very much for this kind referral. It is a privilege to partner with you in the care of your patients. My office would be delighted to assist you with any further consultations or with questions regarding the plan of care outlined. Do not hesitate to call the office or contact me directly.       Sincerely,      GEOVANNA Lu, PAFarheenC  Associate Physician Assistant, Neurosurgery  Clinical   [facility] [place] [address]    Phone: [phone]  Fax: [phone]

## 2025-03-24 ENCOUNTER — APPOINTMENT (OUTPATIENT)
Dept: PRIMARY CARE | Facility: CLINIC | Age: 65
End: 2025-03-24
Payer: COMMERCIAL

## 2025-04-02 DIAGNOSIS — M54.12 CERVICAL RADICULOPATHY: ICD-10-CM

## 2025-04-02 RX ORDER — MELOXICAM 15 MG/1
15 TABLET ORAL DAILY
Qty: 30 TABLET | Refills: 0 | OUTPATIENT
Start: 2025-04-02

## 2025-04-22 DIAGNOSIS — I10 HYPERTENSION, UNSPECIFIED TYPE: ICD-10-CM

## 2025-04-22 DIAGNOSIS — E78.5 HYPERLIPIDEMIA, UNSPECIFIED HYPERLIPIDEMIA TYPE: ICD-10-CM

## 2025-04-22 DIAGNOSIS — K21.9 GASTROESOPHAGEAL REFLUX DISEASE, UNSPECIFIED WHETHER ESOPHAGITIS PRESENT: ICD-10-CM

## 2025-04-22 DIAGNOSIS — E03.9 HYPOTHYROIDISM, UNSPECIFIED TYPE: ICD-10-CM

## 2025-04-22 NOTE — TELEPHONE ENCOUNTER
Spoke w/patient, needs refills on  Aspirin 81 mg - 2 pills left  Esomeprazole 40 mg - 2 pills left  Levothyroxine 150 mcg  Olmesartan 40 mg  Send to [facility]  Last appt. 12/31/24  Upcoming appt. 6/3/25  Last BW 12/30/24

## 2025-04-23 RX ORDER — ESOMEPRAZOLE MAGNESIUM 40 MG/1
40 CAPSULE, DELAYED RELEASE ORAL DAILY
Qty: 90 CAPSULE | Refills: 0 | Status: SHIPPED | OUTPATIENT
Start: 2025-04-23

## 2025-04-23 RX ORDER — ASPIRIN 81 MG/1
81 TABLET ORAL DAILY
Qty: 90 TABLET | Refills: 0 | Status: SHIPPED | OUTPATIENT
Start: 2025-04-23

## 2025-04-23 RX ORDER — OLMESARTAN MEDOXOMIL 40 MG/1
40 TABLET ORAL DAILY
Qty: 90 TABLET | Refills: 0 | Status: SHIPPED | OUTPATIENT
Start: 2025-04-23

## 2025-04-23 RX ORDER — LEVOTHYROXINE SODIUM 150 UG/1
150 TABLET ORAL DAILY
Qty: 90 TABLET | Refills: 0 | Status: SHIPPED | OUTPATIENT
Start: 2025-04-23

## 2025-04-23 RX ORDER — ICOSAPENT ETHYL 1 G/1
2 CAPSULE ORAL
Qty: 120 CAPSULE | Refills: 1 | OUTPATIENT
Start: 2025-04-23

## 2025-05-05 ENCOUNTER — HOSPITAL ENCOUNTER (OUTPATIENT)
Dept: RADIOLOGY | Facility: CLINIC | Age: 65
Discharge: HOME | End: 2025-05-05
Payer: COMMERCIAL

## 2025-05-05 DIAGNOSIS — M54.12 CERVICAL RADICULOPATHY: ICD-10-CM

## 2025-05-05 PROCEDURE — 72050 X-RAY EXAM NECK SPINE 4/5VWS: CPT | Performed by: RADIOLOGY

## 2025-05-05 PROCEDURE — 72050 X-RAY EXAM NECK SPINE 4/5VWS: CPT

## 2025-05-13 ENCOUNTER — APPOINTMENT (OUTPATIENT)
Dept: NEUROSURGERY | Facility: CLINIC | Age: 65
End: 2025-05-13
Payer: COMMERCIAL

## 2025-05-13 VITALS
BODY MASS INDEX: 32.9 KG/M2 | TEMPERATURE: 97.3 F | DIASTOLIC BLOOD PRESSURE: 62 MMHG | WEIGHT: 192.7 LBS | HEART RATE: 77 BPM | HEIGHT: 64 IN | SYSTOLIC BLOOD PRESSURE: 106 MMHG | RESPIRATION RATE: 12 BRPM

## 2025-05-13 DIAGNOSIS — M54.12 CERVICAL RADICULOPATHY: Primary | ICD-10-CM

## 2025-05-13 PROBLEM — D22.5 MELANOCYTIC NEVI OF TRUNK: Status: ACTIVE | Noted: 2020-06-10

## 2025-05-13 PROBLEM — I25.10 ATHEROSCLEROTIC HEART DISEASE OF NATIVE CORONARY ARTERY WITHOUT ANGINA PECTORIS: Status: ACTIVE | Noted: 2025-05-13

## 2025-05-13 PROBLEM — J30.2 SEASONAL ALLERGIC RHINITIS: Status: ACTIVE | Noted: 2019-12-17

## 2025-05-13 PROBLEM — L81.1 CHLOASMA: Status: ACTIVE | Noted: 2020-06-10

## 2025-05-13 PROBLEM — R09.81 NASAL CONGESTION: Status: ACTIVE | Noted: 2019-12-17

## 2025-05-13 PROBLEM — D50.9 IRON DEFICIENCY ANEMIA, UNSPECIFIED: Status: ACTIVE | Noted: 2017-03-22

## 2025-05-13 PROBLEM — Z98.890 POST-OPERATIVE STATE: Status: ACTIVE | Noted: 2017-03-29

## 2025-05-13 PROBLEM — D18.01 HEMANGIOMA OF SKIN AND SUBCUTANEOUS TISSUE: Status: ACTIVE | Noted: 2020-06-10

## 2025-05-13 PROBLEM — K90.9 INTESTINAL MALABSORPTION: Status: ACTIVE | Noted: 2017-03-22

## 2025-05-13 PROBLEM — R21 RASH AND OTHER NONSPECIFIC SKIN ERUPTION: Status: ACTIVE | Noted: 2020-06-10

## 2025-05-13 PROBLEM — Z86.39 HISTORY OF HYPOTHYROIDISM: Status: ACTIVE | Noted: 2025-05-13

## 2025-05-13 PROBLEM — E66.01 SEVERE OBESITY (MULTI): Status: ACTIVE | Noted: 2025-05-13

## 2025-05-13 PROBLEM — L81.9 DISORDER OF PIGMENTATION, UNSPECIFIED: Status: ACTIVE | Noted: 2020-06-10

## 2025-05-13 PROBLEM — L57.9 SKIN CHANGES DUE TO CHRONIC EXPOSURE TO NONIONIZING RADIATION, UNSPECIFIED: Status: ACTIVE | Noted: 2020-06-10

## 2025-05-13 PROCEDURE — 3008F BODY MASS INDEX DOCD: CPT | Performed by: PHYSICIAN ASSISTANT

## 2025-05-13 PROCEDURE — 3074F SYST BP LT 130 MM HG: CPT | Performed by: PHYSICIAN ASSISTANT

## 2025-05-13 PROCEDURE — 4010F ACE/ARB THERAPY RXD/TAKEN: CPT | Performed by: PHYSICIAN ASSISTANT

## 2025-05-13 PROCEDURE — 99213 OFFICE O/P EST LOW 20 MIN: CPT | Performed by: PHYSICIAN ASSISTANT

## 2025-05-13 PROCEDURE — 3078F DIAST BP <80 MM HG: CPT | Performed by: PHYSICIAN ASSISTANT

## 2025-05-13 PROCEDURE — 1036F TOBACCO NON-USER: CPT | Performed by: PHYSICIAN ASSISTANT

## 2025-05-13 RX ORDER — KETOCONAZOLE 20 MG/G
CREAM TOPICAL
COMMUNITY

## 2025-05-13 RX ORDER — FENOFIBRATE 40 MG/1
48 TABLET ORAL
COMMUNITY

## 2025-05-13 RX ORDER — SIMVASTATIN 40 MG/1
TABLET, FILM COATED ORAL
COMMUNITY

## 2025-05-13 RX ORDER — NYSTATIN 100000 [USP'U]/G
1 POWDER TOPICAL EVERY 12 HOURS
COMMUNITY
Start: 2024-04-18

## 2025-05-13 RX ORDER — FLUTICASONE PROPIONATE 50 MCG
SPRAY, SUSPENSION (ML) NASAL
COMMUNITY

## 2025-05-13 ASSESSMENT — PATIENT HEALTH QUESTIONNAIRE - PHQ9
2. FEELING DOWN, DEPRESSED OR HOPELESS: NOT AT ALL
SUM OF ALL RESPONSES TO PHQ9 QUESTIONS 1 AND 2: 0
1. LITTLE INTEREST OR PLEASURE IN DOING THINGS: NOT AT ALL

## 2025-05-13 ASSESSMENT — PAIN SCALES - GENERAL: PAINLEVEL_OUTOF10: 3

## 2025-05-13 NOTE — PROGRESS NOTES
[facility] [place]  Department of Neurological Surgery  Established Patient Visit    History of Present Illness  Mary العراقي is a 64 y.o. year old female who presents to the spine clinic in follow up with continued neck and to right shoulder symptoms.  Internal numbness and tingling continuing further into bicep and forearm present on prior visit and continues through use of meloxicam.  Over the past 3 months she states that her symptoms continue and the same location in neck and arms, though experiencing more frequently (now every day).  Denies left-sided symptoms, balance disturbance, or bowel or bladder issues.  Denies dropping items at this time or coordination loss in her hands.        Patient's BMI is Body mass index is 33.08 kg/m².    14/14 systems reviewed and negative other than what is listed in the history of present illness    Problem List[1]  Medical History[2]  Surgical History[3]  Social History     Tobacco Use    Smoking status: Never     Passive exposure: Never    Smokeless tobacco: Never   Substance Use Topics    Alcohol use: Yes     Comment: MODERATELY     family history includes Anemia in her mother; Arthritis in her mother; Diabetes in her mother; Heart disease in her father and mother; Hyperlipidemia in her father and mother; Hypertension in her father and mother; Kidney disease in her mother.  Current Medications[4]  Allergies[5]    Physical Examination:    General: Well developed, awake/alert/oriented x3, no distress, alert and cooperative  Skin: Warm and dry, no lesions, no rashes  ENMT: Mucous membranes moist, no apparent injury, no lesions seen  Head/Neck: Neck Supple, no apparent injury  Respiratory/Thorax: Normal breath sounds with good chest expansion, thorax symmetric  Cardiovascular: No pitting edema, no JVD    Motor Strength: 5/5 Throughout all extremities    Muscle Bulk: Normal and symmetric in all extremities    Posture:   -- Cervical: Normal  -- Thoracic:  Normal  -- Lumbar : Normal  Paraspinal muscle spasm/tenderness absent.   Midline tenderness absent    Sensation: Mild deficit to sensation right side shoulder over deltoid, otherwise intact to light touch       Results:  I personally reviewed and interpreted the imaging results which included X-ray cervical spine showing significant facet arthropathy C5-7.  Also with anterior listhesis C3 on 4.    Assessment and Plan:    Mary العراقي is a 64 y.o. year old female who presents to the spine clinic in follow up with continued neck and to right shoulder symptoms.  Internal numbness and tingling continuing further into bicep and forearm present on prior visit and continues through use of meloxicam.  Over the past 3 months she states that her symptoms continue and the same location in neck and arms, though experiencing more frequently (now every day).  Denies left-sided symptoms, balance disturbance, or bowel or bladder issues.  Denies dropping items at this time or coordination loss in her hands.    With more consistent/every day symptoms through meloxicam and home stretching program including flexion extension, lateral bending, and rotation as well as x-rays indicative of bony changes and likely neural involvement MRI will be ordered at this time for further evaluation.  She is continuing with physical therapy as well but will follow-up with attending neurosurgeon after MRI.      I have reviewed all prior documentation and reviewed the electronic medical record since admission. I have personally have reviewed all advanced imaging not just the reports and used my interpretation as documented as the relevant findings. I have reviewed the risks and benefits of all treatment recommendations listed in this note with the patient and family.       The above clinical summary has been dictated with voice recognition software. It has not been proofread for grammatical errors, typographical mistakes, or other semantic  inconsistencies.    Thank you for visiting our office today. It was our pleasure to take part in your healthcare.     Do not hesitate to call with any questions regarding your plan of care after leaving at [phone] M-F 8am-4pm.     To clinicians, thank you very much for this kind referral. It is a privilege to partner with you in the care of your patients. My office would be delighted to assist you with any further consultations or with questions regarding the plan of care outlined. Do not hesitate to call the office or contact me directly.       Sincerely,      GEOVANNA Lu, PA-C  Associate Physician Assistant, Neurosurgery  Clinical   [facility] [place] [address]    Phone: [phone]  Fax: [phone]                   [1]   Patient Active Problem List  Diagnosis    Abnormal stress test    Acute maxillary sinusitis    Anxiety    Arthritis    Atrophic vaginitis    Lumbar radicular pain    Back pain    Bilateral knee pain    Breast tenderness    Asthmatic bronchitis (HHS-HCC)    Bronchitis, acute    Bug bite    Combined form of senile cataract of both eyes    Dyspnea    Exertional shortness of breath    Dysuria    Folliculitis    GERD (gastroesophageal reflux disease)    Headache    Hip pain, right    Hyperlipidemia    Hyperopia of both eyes with astigmatism and presbyopia    Hypertension    Hypothyroidism    Thyroid trouble    Meralgia paresthetica of right side    Motion sickness    Myopia with presbyopia of both eyes    Numbness and tingling of hand    Other intervertebral disc degeneration, lumbar region    Right knee pain    Sinusitis    Status post total replacement of right hip    Tinnitus    Diabetes mellitus type 2 without retinopathy (Multi)    Viral infection    Bilateral retinal lattice degeneration    Atherosclerotic heart disease  of native coronary artery without angina pectoris    Chloasma    Displacement of lumbar intervertebral disc without myelopathy    Hemangioma of skin and subcutaneous tissue    History of hypothyroidism    Intestinal malabsorption    Iron deficiency anemia, unspecified    Melanocytic nevi of trunk    Nasal congestion    Disorder of pigmentation, unspecified    Post-operative state    Rash and other nonspecific skin eruption    Seasonal allergic rhinitis    Severe obesity (Multi)    Skin changes due to chronic exposure to nonionizing radiation, unspecified   [2]   Past Medical History:  Diagnosis Date    Acute pharyngitis, unspecified 12/03/2013    Sore throat    Acute upper respiratory infection, unspecified 12/03/2013    Acute upper respiratory infection    Personal history of other diseases of the digestive system     History of gastroesophageal reflux (GERD)    Personal history of other diseases of the nervous system and sense organs 12/03/2013    History of earache    Personal history of other diseases of the respiratory system 08/09/2018    History of sinusitis    Personal history of other diseases of the respiratory system 02/25/2017    History of acute bronchitis    Personal history of other endocrine, nutritional and metabolic disease     History of hypothyroidism    Personal history of other endocrine, nutritional and metabolic disease     History of hyperglycemia   [3]   Past Surgical History:  Procedure Laterality Date    CT ANGIO CORONARY ART WITH HEARTFLOW IF SCORE >30%  11/16/2021    CT HEART CORONARY ANGIOGRAM 11/16/2021 [facility]    OTHER SURGICAL HISTORY  10/06/2021    Back surgery    OTHER SURGICAL HISTORY  10/06/2021    Rotator cuff repair    OTHER SURGICAL HISTORY  10/06/2021    Hip replacement   [4]   Current Outpatient Medications:     nystatin (Mycostatin) 100,000 unit/gram powder, 1 Application every 12 hours., Disp: , Rfl:     aspirin 81 mg EC tablet, Take 1 tablet (81 mg) by mouth  once daily., Disp: 90 tablet, Rfl: 0    esomeprazole (NexIUM) 40 mg DR capsule, Take 1 capsule (40 mg) by mouth once daily., Disp: 90 capsule, Rfl: 0    fenofibrate (Fenoglide) 40 mg tablet, Take 48 mg by mouth., Disp: , Rfl:     fluticasone (Flonase) 50 mcg/actuation nasal spray, Spray 2 sprays every day by intranasal route in the morning., Disp: , Rfl:     icosapent ethyL (Vascepa) 1 gram capsule, Take 2 capsules (2 g) by mouth 2 times daily (morning and late afternoon)., Disp: 360 capsule, Rfl: 1    ketoconazole (NIZOral) 2 % cream, Ketoconazole 2 % External Cream Refills: 0 Active, Disp: , Rfl:     levothyroxine (Synthroid) 150 mcg tablet, Take 1 tablet (150 mcg) by mouth once daily., Disp: 90 tablet, Rfl: 0    meloxicam (Mobic) 15 mg tablet, TAKE 1 TABLET BY MOUTH EVERY DAY, Disp: 30 tablet, Rfl: 0    metFORMIN (Glucophage) 500 mg tablet, Take 1 tablet (500 mg) by mouth every 12 hours. Take with food., Disp: 60 tablet, Rfl: 0    olmesartan (BENIcar) 40 mg tablet, Take 1 tablet (40 mg) by mouth once daily., Disp: 90 tablet, Rfl: 0    rosuvastatin (Crestor) 40 mg tablet, Take 1 tablet (40 mg) by mouth once daily., Disp: 90 tablet, Rfl: 1    scopolamine (Transderm-Scop) 1 mg over 3 days patch 3 day, Place 1 patch on the skin every 3rd day., Disp: 10 patch, Rfl: 0    sertraline (Zoloft) 100 mg tablet, Take 1 tablet (100 mg) by mouth once daily., Disp: 90 tablet, Rfl: 1    simvastatin (Zocor) 40 mg tablet, , Disp: , Rfl:   [5]   Allergies  Allergen Reactions    Sulfa Dyne Hives    Sulfa (Sulfonamide Antibiotics) Unknown

## 2025-05-16 ENCOUNTER — EVALUATION (OUTPATIENT)
Dept: PHYSICAL THERAPY | Facility: CLINIC | Age: 65
End: 2025-05-16
Payer: COMMERCIAL

## 2025-05-16 DIAGNOSIS — G89.29 NECK PAIN, CHRONIC: Primary | ICD-10-CM

## 2025-05-16 DIAGNOSIS — M54.2 NECK PAIN, CHRONIC: Primary | ICD-10-CM

## 2025-05-16 DIAGNOSIS — M54.12 CERVICAL RADICULOPATHY: ICD-10-CM

## 2025-05-16 PROCEDURE — 97110 THERAPEUTIC EXERCISES: CPT | Mod: GP

## 2025-05-16 PROCEDURE — 97161 PT EVAL LOW COMPLEX 20 MIN: CPT | Mod: GP

## 2025-05-16 ASSESSMENT — PATIENT HEALTH QUESTIONNAIRE - PHQ9
2. FEELING DOWN, DEPRESSED OR HOPELESS: NOT AT ALL
1. LITTLE INTEREST OR PLEASURE IN DOING THINGS: NOT AT ALL
SUM OF ALL RESPONSES TO PHQ9 QUESTIONS 1 AND 2: 0

## 2025-05-16 ASSESSMENT — ENCOUNTER SYMPTOMS
OCCASIONAL FEELINGS OF UNSTEADINESS: 0
LOSS OF SENSATION IN FEET: 0
DEPRESSION: 0

## 2025-05-16 NOTE — PROGRESS NOTES
Physical Therapy Evaluation    Patient Name: Mary العراقي  MRN: [id number]  Language: English-speaking  Today's Date: 5/16/2025  Time Calculation  Start Time: 1405  Stop Time: 1445  Time Calculation (min): 40 min  PT Evaluation Time Entry  PT Evaluation (Low) Time Entry: 30  PT Therapeutic Procedures Time Entry  Therapeutic Exercise Time Entry: 10        Insurance:  Employee Medical, $25 co-pay  Authorization required by insurance after initial evaluation  Primary diagnosis: chronic neck pain  Visit # 1    Assessment   Mary العراقي is a R hand dominant 64 y.o. referred for cervical radiculopathy who presents with R sided neck pain and intermittent N&T located R sided neck, R superior shoulder. Patient demonstrates decreased cervical AROM, concordant pain and N&T with cervical mobility, as well as strength deficits. 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 Patient would benefit from PT interventions to address the stated impairments, improve functional mobility, establish HEP, and assist with management of chronic symptoms.    Plan   Treatment/Interventions: Cryotherapy, Education/ Instruction, Hot pack, Manual therapy, Neuromuscular re-education, Therapeutic activities, Therapeutic exercises, Gait Training  PT Plan: Skilled PT  PT Frequency: 1-2 times per week  Duration: 8 visits  Rehab Potential: Good  Plan of Care Agreement: Patient    The physical therapy prognosis is Good for the patient to achieve their goals. The patient tolerated therapy treatment today well with no adverse effects. Clinical presentation: stable. Level of clinical decision making is low.  Personal factors that may impact therapy include: chronic, constant symptoms       Current Problem  1. Neck pain, chronic  Follow Up In Physical Therapy      2. Cervical radiculopathy  Referral to Physical Therapy    Follow Up In Physical Therapy        Problem List Items Addressed This Visit   "         ICD-10-CM    Neck pain, chronic - Primary M54.2, G89.29    Relevant Orders    Follow Up In Physical Therapy    Cervical radiculopathy M54.12    Relevant Orders    Follow Up In Physical Therapy       General:  General  Reason for Referral: M54.12 (ICD-10-CM) - Cervical radiculopathy  Referred By: Dhaval Tillman PA-C  Precautions:  Precautions  STEADI Fall Risk Score (The score of 4 or more indicates an increased risk of falling): 0    Subjective:  Chief complaints/Mechanism of Injury: woke up one morning with stiff neck, neck worsened with difficulty looking right and left. Went to Chiropractor in the beginning, then started feeling better. Able to look right and left a little easier now. Last four months, noticing pins & needles in the R side of the neck. Does not go down the arm.  Onset Date: 6+ months ago  Referred for PT:  02/04/2025  Previous History: thyroid disorder, borderline DM, cervical arthritis   Personal Factors that may impact care: chronic, constant symptoms   Patient is cleared for physical therapy services by physician referral. Discussed concerns on intake form. Advised patient to follow up with referring provider to address.    Pain:  Current: 2/10 Best: 2/10 Worst: \"12\"/10   Location: R sided neck pain   Type: achy   Aggravators: more movement, rotating head R/L   Alleviators: sitting, ice   Numbness/tingling: R side of the neck, randomly comes on/goes away; intermittent     Function:   Work/Recreation:    Prior Level: history of neck and shoulder pain in the past (when )    Current limitations: school, putting head down to read or milton, driving   Condition: Improving     Home Situation: lives with     Sleep: sidesleeper     Goals for Therapy:    Be able to move it more comfortably and not to have the sensation    Objective   Gait: Indep  Posture: forward head, rounded shoulders, decreased lumbar lordosis in sitting  Palpation: (+) TTP R cervical " paraspinals, upper trap  Joint Mobility (-) concordant pain CPA cervical spine  Sensation: intact to light touch B UE  Reflexes: 2+ biceps, brachioradialis, (-) caballero's    Cervical AROM:  Flexion: 65 deg  Extension: 30 deg, P! N&T  Side Bend R: 25 deg, P!  Side Bend L: 25 deg, P!  Rotation R: 47 deg, stiff  Rotation L: 50 deg     Shoulder AROM:  Flexion: 175 deg B    Shoulder Strength:  Flexion: R 4+/5, L 5/5  Abduction: R 4+/5, L 5/5    Elbow & Wrist Strength:  Flexion: R 4+/5, L 5/5  Extension: R 4/5, L 5/5    Special Tests:   (+) distraction, spurlings  (-) ULTT    Outcome Measures:  Neck Disability Index (NDI): 16% disability    Treatment:  Therapeutic Exercise: Education on posture, positioning with use of lumbar roll, modification or discontinuation of any exercise that increases symptoms (i.e. peripheralization of symptoms).  Supine cervical retractions  Seated cervical retraction   Seated scapular retractions     OP Education: verbal, demonstration, HEP handout    Access Code: [email or web address]  URL: [email or web address]  Date: 05/16/2025  Prepared by: Livia    Exercises  - Seated Correct Posture   - Seated Cervical Retraction  - 1-3 x daily - 7 x weekly - 1-2 sets - 10 reps  - Seated Scapular Retraction  - 1 x daily - 7 x weekly - 2 sets - 10 reps  - Supine Neck Retraction with Towel  - 2-4 x daily - 7 x weekly - 1-2 sets - 10 reps    Goals:  Active       PT Problem       Patient will demonstrate cervical AROM without onset of N&T to improve ease with daily functional mobility       Start:  05/16/25    Expected End:  08/14/25            Patient will improve cervical rotation AROM by 5-10 degrees to improve driving       Start:  05/16/25    Expected End:  08/14/25            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

## 2025-05-19 ENCOUNTER — TREATMENT (OUTPATIENT)
Dept: PHYSICAL THERAPY | Facility: CLINIC | Age: 65
End: 2025-05-19
Payer: COMMERCIAL

## 2025-05-19 DIAGNOSIS — M54.12 CERVICAL RADICULOPATHY: ICD-10-CM

## 2025-05-19 DIAGNOSIS — G89.29 NECK PAIN, CHRONIC: Primary | ICD-10-CM

## 2025-05-19 DIAGNOSIS — M54.2 NECK PAIN, CHRONIC: Primary | ICD-10-CM

## 2025-05-19 PROCEDURE — 97140 MANUAL THERAPY 1/> REGIONS: CPT | Mod: GP

## 2025-05-19 PROCEDURE — 97110 THERAPEUTIC EXERCISES: CPT | Mod: GP

## 2025-05-19 NOTE — PROGRESS NOTES
"Physical Therapy    Physical Therapy Treatment    Patient Name: Mary العراقي  MRN: [id number]  Today's Date: 5/19/2025  Time Calculation  Start Time: 1405  Stop Time: 1450  Time Calculation (min): 45 min     PT Therapeutic Procedures Time Entry  Manual Therapy Time Entry: 12  Therapeutic Exercise Time Entry: 33        Insurance:  Employee Medical, $25 co-pay  Authorization required by insurance after initial evaluation  Primary diagnosis: chronic neck pain  Visit # 2      Assessment:  Patient returns for first follow up visit. Minor cues for proper set-up and technique with HEP review. Progressed with mobility and strengthening to patients tolerance,as well as instructed in body mechanics/postural awareness. Addition of manual therapy to assist with management of symptoms. No pain or N&T at end of treatment. Updated HEP and plan to ensure Good carry over at next visit.    Plan:  Continue per initial PT POC. Assess response between sessions.    Current Problem  1. Neck pain, chronic  Follow Up In Physical Therapy      2. Cervical radiculopathy  Follow Up In Physical Therapy        Problem List Items Addressed This Visit           ICD-10-CM    Neck pain, chronic - Primary M54.2, G89.29    Cervical radiculopathy M54.12       General   **Evaluation 05/16/2025 \"referred for cervical radiculopathy who presents with R sided neck pain and intermittent N&T located R sided neck, R superior shoulder\"    Precautions:   Precautions  STEADI Fall Risk Score (The score of 4 or more indicates an increased risk of falling): 0    Subjective    Completed journal since last visit when onset of symptoms arose N&T B upper trap region. When on computer, driving, watching TV, playing on floor with grandson, waking up in the morning, doing dishes, when on a walk, lying down at night until falling asleep. Helped to do the cervical retractions.    Pain  No pain sitting, with turning the head 2-3/10.    Objective       Treatments:  Therapeutic " Exercise: Education on posture, positioning with use of lumbar roll, modification or discontinuation of any exercise that increases symptoms (i.e. peripheralization of symptoms).  Seated cervical SB x 10 reps ea  Seated cervical retraction with extension x 10 reps ea  Seated cervical retraction x 20 reps  Seated scapular retractions with green TB x 20 reps   Supine cervical retractions 2 x 10 reps  Body mechanics with daily functional mobility + handout    Manual Therapy:     OP Education: verbal, demonstration, HEP handout     Access Code: [email or web address]  URL: [email or web address]  Date: 05/16/2025  Prepared by: Livia Chavez  - Seated Correct Posture   - Seated Cervical Retraction  - 1-3 x daily - 7 x weekly - 1-2 sets - 10 reps  - Seated Scapular Retraction  - 1 x daily - 7 x weekly - 2 sets - 10 reps  - Supine Neck Retraction with Towel  - 2-4 x daily - 7 x weekly - 1-2 sets - 10 reps    OP EDUCATION: verbal, demonstration, HEP handout       Goals:  Active       PT Problem       Patient will demonstrate cervical AROM without onset of N&T to improve ease with daily functional mobility       Start:  05/16/25    Expected End:  08/14/25            Patient will improve cervical rotation AROM by 5-10 degrees to improve driving       Start:  05/16/25    Expected End:  08/14/25            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

## 2025-05-27 ENCOUNTER — HOSPITAL ENCOUNTER (OUTPATIENT)
Dept: RADIOLOGY | Facility: CLINIC | Age: 65
Discharge: HOME | End: 2025-05-27
Payer: COMMERCIAL

## 2025-05-27 ENCOUNTER — APPOINTMENT (OUTPATIENT)
Dept: OPHTHALMOLOGY | Age: 65
End: 2025-05-27
Payer: COMMERCIAL

## 2025-05-27 VITALS — HEIGHT: 64 IN | BODY MASS INDEX: 32.9 KG/M2 | WEIGHT: 192.68 LBS

## 2025-05-27 DIAGNOSIS — H52.03 HYPEROPIA OF BOTH EYES WITH ASTIGMATISM AND PRESBYOPIA: Primary | ICD-10-CM

## 2025-05-27 DIAGNOSIS — H52.203 HYPEROPIA OF BOTH EYES WITH ASTIGMATISM AND PRESBYOPIA: Primary | ICD-10-CM

## 2025-05-27 DIAGNOSIS — Z12.31 ENCOUNTER FOR SCREENING MAMMOGRAM FOR MALIGNANT NEOPLASM OF BREAST: ICD-10-CM

## 2025-05-27 DIAGNOSIS — H52.4 HYPEROPIA OF BOTH EYES WITH ASTIGMATISM AND PRESBYOPIA: Primary | ICD-10-CM

## 2025-05-27 DIAGNOSIS — H35.372 EPIRETINAL MEMBRANE (ERM) OF LEFT EYE: ICD-10-CM

## 2025-05-27 DIAGNOSIS — H25.813 COMBINED FORM OF SENILE CATARACT OF BOTH EYES: ICD-10-CM

## 2025-05-27 DIAGNOSIS — E11.9 DIABETES MELLITUS TYPE 2 WITHOUT RETINOPATHY (MULTI): ICD-10-CM

## 2025-05-27 PROCEDURE — 92014 COMPRE OPH EXAM EST PT 1/>: CPT | Performed by: OPTOMETRIST

## 2025-05-27 PROCEDURE — 92015 DETERMINE REFRACTIVE STATE: CPT | Performed by: OPTOMETRIST

## 2025-05-27 PROCEDURE — 77067 SCR MAMMO BI INCL CAD: CPT

## 2025-05-27 PROCEDURE — 77063 BREAST TOMOSYNTHESIS BI: CPT | Performed by: RADIOLOGY

## 2025-05-27 PROCEDURE — 92134 CPTRZ OPH DX IMG PST SGM RTA: CPT | Performed by: OPTOMETRIST

## 2025-05-27 PROCEDURE — 99070(U24) BRUDER EYE MASK: Performed by: OPTOMETRIST

## 2025-05-27 PROCEDURE — 77067 SCR MAMMO BI INCL CAD: CPT | Performed by: RADIOLOGY

## 2025-05-27 PROCEDURE — 2023F DILAT RTA XM W/O RTNOPTHY: CPT | Performed by: OPTOMETRIST

## 2025-05-27 PROCEDURE — TAXSU SALES TAX SUMMIT COUNTY: Performed by: OPTOMETRIST

## 2025-05-27 ASSESSMENT — CONF VISUAL FIELD
OD_NORMAL: 1
OD_SUPERIOR_TEMPORAL_RESTRICTION: 0
OD_SUPERIOR_NASAL_RESTRICTION: 0
OS_NORMAL: 1
OD_INFERIOR_TEMPORAL_RESTRICTION: 0
OD_INFERIOR_NASAL_RESTRICTION: 0
OS_INFERIOR_NASAL_RESTRICTION: 0
OS_INFERIOR_TEMPORAL_RESTRICTION: 0
OS_SUPERIOR_NASAL_RESTRICTION: 0
OS_SUPERIOR_TEMPORAL_RESTRICTION: 0

## 2025-05-27 ASSESSMENT — REFRACTION_WEARINGRX
OS_SPHERE: +1.50
OD_ADD: +2.25
OS_ADD: +2.25
OD_CYLINDER: -0.50
OS_CYLINDER: -0.25
OD_AXIS: 065
OD_SPHERE: +2.00
OS_AXIS: 065

## 2025-05-27 ASSESSMENT — TONOMETRY
IOP_METHOD: GOLDMANN APPLANATION
OS_IOP_MMHG: 18
OD_IOP_MMHG: 18

## 2025-05-27 ASSESSMENT — REFRACTION_MANIFEST
OD_ADD: +2.50
OS_AXIS: 040
OD_CYLINDER: -0.50
OD_AXIS: 065
OS_CYLINDER: -0.25
OS_ADD: +2.50
OS_SPHERE: +2.00
OD_SPHERE: +2.00

## 2025-05-27 ASSESSMENT — ENCOUNTER SYMPTOMS
CARDIOVASCULAR NEGATIVE: 0
PSYCHIATRIC NEGATIVE: 0
ALLERGIC/IMMUNOLOGIC NEGATIVE: 0
MUSCULOSKELETAL NEGATIVE: 0
RESPIRATORY NEGATIVE: 0
ENDOCRINE NEGATIVE: 0
HEMATOLOGIC/LYMPHATIC NEGATIVE: 0
EYES NEGATIVE: 1
CONSTITUTIONAL NEGATIVE: 0
NEUROLOGICAL NEGATIVE: 0
GASTROINTESTINAL NEGATIVE: 0

## 2025-05-27 ASSESSMENT — VISUAL ACUITY
OD_CC: 20/20
METHOD: SNELLEN - LINEAR
OS_CC: 20/20
OD_CC+: -2
CORRECTION_TYPE: GLASSES

## 2025-05-27 ASSESSMENT — EXTERNAL EXAM - LEFT EYE: OS_EXAM: NORMAL

## 2025-05-27 ASSESSMENT — EXTERNAL EXAM - RIGHT EYE: OD_EXAM: NORMAL

## 2025-05-27 ASSESSMENT — SLIT LAMP EXAM - LIDS
COMMENTS: GOOD POSITION
COMMENTS: GOOD POSITION

## 2025-05-27 ASSESSMENT — CUP TO DISC RATIO
OD_RATIO: 0.3
OS_RATIO: 0.3

## 2025-05-27 NOTE — PROGRESS NOTES
DM2 and no retinopathy.   VA correctable to 20/20 OD/OS, BAT 20/25 OD/OS.  Mild MGD and a few small conj cysts on inferior tarsal conjunctiva. Patient reassured. Can do warm compresses every day PRN.   A spectacle prescription was dispensed to be used as needed.   Has tried DVO Cls and didn't like it.   Nuclear sclerosis OU.     ERM OS.   Optical coherence tomography of the macula revealed:   OD: Normal foveal contour, photoreceptor, retinal pigment epithelium, IS/OS junction, central field 289 micron.  Vitreous hyaloid base visualized.  Findings are PVD  OS:  Irregular and elevated foveal contour more so inferior temporal, nl photoreceptor, retinal pigment epithelium, IS/OS junction, central field 320 micron.  Vitreous hyaloid base not visualized.  Findings are PVD and ERM     Lattice degeneration OU. The patient was asked to return to our clinic or seek out eye care ASAP if new flashes of light or floaters are noted.      The patient was asked to return to our clinic in one year or sooner if ocular or vision changes occur.   1 year manifest refraction (MR) BAT OCT mac and DFE.

## 2025-05-30 ENCOUNTER — TREATMENT (OUTPATIENT)
Dept: PHYSICAL THERAPY | Facility: CLINIC | Age: 65
End: 2025-05-30
Payer: COMMERCIAL

## 2025-05-30 DIAGNOSIS — M54.12 CERVICAL RADICULOPATHY: ICD-10-CM

## 2025-05-30 DIAGNOSIS — G89.29 NECK PAIN, CHRONIC: ICD-10-CM

## 2025-05-30 DIAGNOSIS — M54.2 NECK PAIN, CHRONIC: ICD-10-CM

## 2025-05-30 LAB
ALBUMIN SERPL-MCNC: 4.5 G/DL (ref 3.6–5.1)
ALBUMIN/CREAT UR: 20 MG/G CREAT
ALP SERPL-CCNC: 62 U/L (ref 37–153)
ALT SERPL-CCNC: 12 U/L (ref 6–29)
ANION GAP SERPL CALCULATED.4IONS-SCNC: 9 MMOL/L (CALC) (ref 7–17)
AST SERPL-CCNC: 17 U/L (ref 10–35)
BILIRUB SERPL-MCNC: 0.4 MG/DL (ref 0.2–1.2)
BUN SERPL-MCNC: 28 MG/DL (ref 7–25)
CALCIUM SERPL-MCNC: 9.5 MG/DL (ref 8.6–10.4)
CHLORIDE SERPL-SCNC: 102 MMOL/L (ref 98–110)
CHOLEST SERPL-MCNC: 143 MG/DL
CHOLEST/HDLC SERPL: 3.2 (CALC)
CO2 SERPL-SCNC: 25 MMOL/L (ref 20–32)
CREAT SERPL-MCNC: 0.91 MG/DL (ref 0.5–1.05)
CREAT UR-MCNC: 388 MG/DL (ref 20–275)
EGFRCR SERPLBLD CKD-EPI 2021: 70 ML/MIN/1.73M2
EST. AVERAGE GLUCOSE BLD GHB EST-MCNC: 128 MG/DL
EST. AVERAGE GLUCOSE BLD GHB EST-SCNC: 7.1 MMOL/L
GLUCOSE SERPL-MCNC: 94 MG/DL (ref 65–99)
HBA1C MFR BLD: 6.1 %
HDLC SERPL-MCNC: 45 MG/DL
LDLC SERPL CALC-MCNC: 74 MG/DL (CALC)
MICROALBUMIN UR-MCNC: 7.9 MG/DL
NONHDLC SERPL-MCNC: 98 MG/DL (CALC)
POTASSIUM SERPL-SCNC: 4.4 MMOL/L (ref 3.5–5.3)
PROT SERPL-MCNC: 7.4 G/DL (ref 6.1–8.1)
SODIUM SERPL-SCNC: 136 MMOL/L (ref 135–146)
T4 FREE SERPL-MCNC: 1.7 NG/DL (ref 0.8–1.8)
TRIGL SERPL-MCNC: 160 MG/DL
TSH SERPL-ACNC: 0.05 MIU/L (ref 0.4–4.5)

## 2025-05-30 PROCEDURE — 97110 THERAPEUTIC EXERCISES: CPT | Mod: GP | Performed by: PHYSICAL THERAPIST

## 2025-05-30 PROCEDURE — 97140 MANUAL THERAPY 1/> REGIONS: CPT | Mod: GP | Performed by: PHYSICAL THERAPIST

## 2025-05-30 NOTE — PROGRESS NOTES
"Physical Therapy    Physical Therapy Treatment    Patient Name: Mary العراقي  MRN: [id number]  Today's Date: 5/30/2025    Time Entry:   Time Calculation  Start Time: 1045  Stop Time: 1113  Time Calculation (min): 28 min     PT Therapeutic Procedures Time Entry  Manual Therapy Time Entry: 15  Therapeutic Exercise Time Entry: 10    Assessment:  Good tolerance to all exercises and all manual therapy. Added manual therapy and nerve glides to decrease R UE radicular symptoms. Still has stiffness when looking to the right at end of session. No adverse reactions. Patient will still need skilled PT.     Plan:  Continue PT as planned     Current Problem  1. Cervical radiculopathy  Follow Up In Physical Therapy      2. Neck pain, chronic  Follow Up In Physical Therapy        Subjective    Patient reports that her right-sided cervical radiculopathy is overall unchanged. Reports that her R UE symptoms occur without \"rhyme or reason.\" She gets R UE numbness several times per day. She does report that the cervical retractions provide her relief. Currently, she just has stiffness in her neck. She rates her neck pain as a 4/10 during rotation.     Objective   Treatments:  Therapeutic Exercise:   -Resisted rows 2 x 10 yellow  -B shoulder extension 2 x 10 yellow   -B shoulder ER 2 x 10 yellow  -Median nerve glides 2 x 10 right   -Cervical retraction x 10   -Cervical retraction with OP x 10     Manual Therapy:  -Cervical lateral/up glides grade II-IV supine  -Thoracic PA mobilizations grade II-IV prone   -Suboccipital release, manual cervical traction     OP EDUCATION:  Manual therapy, prognosis, exercise technique     Goals:  Active       PT Problem       Patient will demonstrate cervical AROM without onset of N&T to improve ease with daily functional mobility       Start:  05/16/25    Expected End:  08/14/25            Patient will improve cervical rotation AROM by 5-10 degrees to improve driving       Start:  05/16/25    Expected End: "  08/14/25            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

## 2025-06-02 ENCOUNTER — TREATMENT (OUTPATIENT)
Dept: PHYSICAL THERAPY | Facility: CLINIC | Age: 65
End: 2025-06-02
Payer: COMMERCIAL

## 2025-06-02 DIAGNOSIS — M54.2 NECK PAIN, CHRONIC: ICD-10-CM

## 2025-06-02 DIAGNOSIS — M54.12 CERVICAL RADICULOPATHY: ICD-10-CM

## 2025-06-02 DIAGNOSIS — G89.29 NECK PAIN, CHRONIC: ICD-10-CM

## 2025-06-02 PROCEDURE — 97110 THERAPEUTIC EXERCISES: CPT | Mod: GP | Performed by: PHYSICAL THERAPIST

## 2025-06-02 PROCEDURE — 97140 MANUAL THERAPY 1/> REGIONS: CPT | Mod: GP | Performed by: PHYSICAL THERAPIST

## 2025-06-02 NOTE — PROGRESS NOTES
Physical Therapy    Physical Therapy Treatment    Patient Name: Mary العراقي  MRN: [id number]  Today's Date: 6/2/2025  Visit #4  Time Calculation  Start Time: 1455  Stop Time: 1546  Time Calculation (min): 51 min     PT Therapeutic Procedures Time Entry  Manual Therapy Time Entry: 20  Therapeutic Exercise Time Entry: 31        Payor:  EMPLOYEE MEDICAL PLAN / Plan:  EMPLOYEE MEDICAL PLAN TRADITIONAL  / Product Type: *No Product type* /   Referred by:Dhaval Tillman  Current Problem  Problem List Items Addressed This Visit           ICD-10-CM    Neck pain, chronic M54.2, G89.29    Cervical radiculopathy M54.12        Subjective   Pt reports she is a little better.  Neck still stiff but able to reduce tingling with cerv retractions.   Current Pain: Better  Pt has been compliant with HEP Yes      Objective  No objective measures assessed this visit    Assessment:  Pt is progressing as expected towards goals. Worked on correct form with cerv retractions.  Manual to increased joint mobility  This session exercises were progressed (p) or added (NEW) as documented in the treatment section.  The pt required min to mod cues and demonstration to complete exercises with proper form.    Pt responded to all activities without adverse effects.    Pt continues to lack postural strength necessary for the completion of baseline ADLs without pain.  Pt will benefit from further therapy to continue to progress towards meeting functional and impairment goals.    Plan:  Continue to progress treatment to improve strength, range of motion, joint mobility, pain, and flexibility impairments which are impacting patient's function.    Treatments:  Visit # 4  There ex:  Stepper 5 min  Pulleys with cerv rot x 3 min (tingling starting)  Mid rows and pulldowns RTB x 20  Anti-rotation press RTB x 20  Shoulder ER and IR RTB x 20 ea  Manual:  The patient was edcuated about the risks and benefits of manual therapy.  Pt agrees to treatment.  Pt was given  opportunity to stop if needed.  No adverse effects were noted.    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

## 2025-06-03 ENCOUNTER — APPOINTMENT (OUTPATIENT)
Dept: PRIMARY CARE | Facility: CLINIC | Age: 65
End: 2025-06-03
Payer: COMMERCIAL

## 2025-06-03 VITALS
OXYGEN SATURATION: 98 % | HEIGHT: 64 IN | BODY MASS INDEX: 32.42 KG/M2 | HEART RATE: 95 BPM | RESPIRATION RATE: 18 BRPM | TEMPERATURE: 98.4 F | DIASTOLIC BLOOD PRESSURE: 68 MMHG | WEIGHT: 189.9 LBS | SYSTOLIC BLOOD PRESSURE: 116 MMHG

## 2025-06-03 DIAGNOSIS — K21.9 GASTROESOPHAGEAL REFLUX DISEASE, UNSPECIFIED WHETHER ESOPHAGITIS PRESENT: ICD-10-CM

## 2025-06-03 DIAGNOSIS — T75.3XXS MOTION SICKNESS, SEQUELA: ICD-10-CM

## 2025-06-03 DIAGNOSIS — I10 HYPERTENSION, UNSPECIFIED TYPE: ICD-10-CM

## 2025-06-03 DIAGNOSIS — B37.2 CUTANEOUS CANDIDIASIS: ICD-10-CM

## 2025-06-03 DIAGNOSIS — E03.9 HYPOTHYROIDISM, UNSPECIFIED TYPE: ICD-10-CM

## 2025-06-03 DIAGNOSIS — J01.90 ACUTE NON-RECURRENT SINUSITIS, UNSPECIFIED LOCATION: ICD-10-CM

## 2025-06-03 DIAGNOSIS — E11.9 TYPE 2 DIABETES MELLITUS WITHOUT COMPLICATION, WITHOUT LONG-TERM CURRENT USE OF INSULIN: Primary | ICD-10-CM

## 2025-06-03 DIAGNOSIS — F41.8 SITUATIONAL ANXIETY: ICD-10-CM

## 2025-06-03 DIAGNOSIS — F41.9 ANXIETY: ICD-10-CM

## 2025-06-03 DIAGNOSIS — R53.83 FATIGUE, UNSPECIFIED TYPE: ICD-10-CM

## 2025-06-03 DIAGNOSIS — E78.5 HYPERLIPIDEMIA, UNSPECIFIED HYPERLIPIDEMIA TYPE: ICD-10-CM

## 2025-06-03 DIAGNOSIS — R06.83 SNORING: ICD-10-CM

## 2025-06-03 PROCEDURE — 4010F ACE/ARB THERAPY RXD/TAKEN: CPT | Performed by: FAMILY MEDICINE

## 2025-06-03 PROCEDURE — 3074F SYST BP LT 130 MM HG: CPT | Performed by: FAMILY MEDICINE

## 2025-06-03 PROCEDURE — 3008F BODY MASS INDEX DOCD: CPT | Performed by: FAMILY MEDICINE

## 2025-06-03 PROCEDURE — 99214 OFFICE O/P EST MOD 30 MIN: CPT | Performed by: FAMILY MEDICINE

## 2025-06-03 PROCEDURE — 3078F DIAST BP <80 MM HG: CPT | Performed by: FAMILY MEDICINE

## 2025-06-03 RX ORDER — SCOPOLAMINE 1 MG/3D
1 PATCH, EXTENDED RELEASE TRANSDERMAL
Qty: 10 PATCH | Refills: 0 | Status: SHIPPED | OUTPATIENT
Start: 2025-06-03

## 2025-06-03 RX ORDER — ALPRAZOLAM 0.5 MG/1
0.5 TABLET ORAL 3 TIMES DAILY PRN
Qty: 10 TABLET | Refills: 0 | Status: SHIPPED | OUTPATIENT
Start: 2025-06-03 | End: 2026-01-29

## 2025-06-03 RX ORDER — LEVOTHYROXINE SODIUM 150 UG/1
150 TABLET ORAL DAILY
Qty: 90 TABLET | Refills: 1 | Status: SHIPPED | OUTPATIENT
Start: 2025-06-03

## 2025-06-03 RX ORDER — OLMESARTAN MEDOXOMIL 40 MG/1
40 TABLET ORAL DAILY
Qty: 90 TABLET | Refills: 1 | Status: SHIPPED | OUTPATIENT
Start: 2025-06-03

## 2025-06-03 RX ORDER — ROSUVASTATIN CALCIUM 40 MG/1
40 TABLET, COATED ORAL DAILY
Qty: 90 TABLET | Refills: 1 | Status: SHIPPED | OUTPATIENT
Start: 2025-06-03

## 2025-06-03 RX ORDER — NYSTATIN 100000 U/G
CREAM TOPICAL 2 TIMES DAILY
Qty: 30 G | Refills: 0 | Status: SHIPPED | OUTPATIENT
Start: 2025-06-03 | End: 2025-06-10

## 2025-06-03 RX ORDER — ESOMEPRAZOLE MAGNESIUM 40 MG/1
40 CAPSULE, DELAYED RELEASE ORAL DAILY
Qty: 90 CAPSULE | Refills: 1 | Status: SHIPPED | OUTPATIENT
Start: 2025-06-03

## 2025-06-03 RX ORDER — SERTRALINE HYDROCHLORIDE 100 MG/1
100 TABLET, FILM COATED ORAL DAILY
Qty: 90 TABLET | Refills: 1 | Status: SHIPPED | OUTPATIENT
Start: 2025-06-03

## 2025-06-03 RX ORDER — METFORMIN HYDROCHLORIDE 500 MG/1
500 TABLET ORAL EVERY 12 HOURS
Qty: 180 TABLET | Refills: 1 | Status: SHIPPED | OUTPATIENT
Start: 2025-06-03

## 2025-06-03 RX ORDER — AMOXICILLIN 875 MG/1
875 TABLET, COATED ORAL 2 TIMES DAILY
Qty: 20 TABLET | Refills: 0 | Status: SHIPPED | OUTPATIENT
Start: 2025-06-03 | End: 2025-06-13

## 2025-06-03 RX ORDER — ASPIRIN 81 MG/1
81 TABLET ORAL DAILY
Qty: 90 TABLET | Refills: 1 | Status: SHIPPED | OUTPATIENT
Start: 2025-06-03

## 2025-06-03 RX ORDER — ICOSAPENT ETHYL 1 G/1
2 CAPSULE ORAL
Qty: 360 CAPSULE | Refills: 1 | Status: SHIPPED | OUTPATIENT
Start: 2025-06-03

## 2025-06-03 ASSESSMENT — PATIENT HEALTH QUESTIONNAIRE - PHQ9
1. LITTLE INTEREST OR PLEASURE IN DOING THINGS: NOT AT ALL
2. FEELING DOWN, DEPRESSED OR HOPELESS: NOT AT ALL
SUM OF ALL RESPONSES TO PHQ9 QUESTIONS 1 AND 2: 0

## 2025-06-03 ASSESSMENT — ENCOUNTER SYMPTOMS
OCCASIONAL FEELINGS OF UNSTEADINESS: 0
LOSS OF SENSATION IN FEET: 0
DEPRESSION: 0

## 2025-06-03 NOTE — PROGRESS NOTES
Subjective   Patient ID: Mayr العراقي is a 64 y.o. female who presents for Follow-up (EPV, F/U HTN, DM, Cholesterol/Blood work completed 5/29/25, C/O Sinus congestion x 2 months /Pt planned for [facility] for 2 weeks).    History of Present Illness  Mary العراقي is a 64 year old female with diabetes and hypothyroidism     She is concerned about her recent lab results, particularly her thyroid function and diabetes management. Her TSH levels were low, but her free T4 was normal. She is on Synthroid for hypothyroidism, as she did not feel well on generic levothyroxine. Her hemoglobin A1c was 6.1, indicating well-controlled diabetes. She takes metformin twice daily for diabetes management.    She has been experiencing sinus symptoms for over two months, including a sore throat that comes and goes, headaches, pressure, and congestion. The sore throat is not like strep throat and is associated with drainage. She has had intermittent ear pain, which has subsided. She has only used Tylenol for headaches and has not tried other treatments for these symptoms.    She has a history of neck issues and is undergoing evaluation with an MRI planned. She attends physical therapy and reports improvement in her symptoms. She also sees a chiropractor for sciatic nerve issues.    She is preparing for a trip to [facility] and experiences motion sickness, for which she uses an ear patch. She also uses alprazolam for anxiety during flights.     She is concerned about her sleep quality, noting snoring and feeling tired upon waking. She wonders about having a sleep study.    Review of Systems  Genl: The patient has been in good health without recent weight change, fevers, or night sweats  CVS: No chest pain, irregular heartbeat, shortness of breath, or swollen ankles  Resp: No cough or difficulty breathing  GI: No change in bowel habits or abdominal pain. No heartburn  : No urinary problems.   Objective     /68 (BP Location: Right  "arm, Patient Position: Sitting)   Pulse 95   Temp 36.9 °C (98.4 °F) (Oral)   Resp 18   Ht 1.626 m (5' 4.02\")   Wt 86.1 kg (189 lb 14.4 oz)   SpO2 98%   BMI 32.58 kg/m²      Physical Exam  Alert, well-appearing.    HEENT: OP clear. Sclera white. Pupils equal and round. EACs and TMs clear.    Neck: Supple. No palpable masses. Thyroid was not enlarged.    CVS: RRR, no murmurs. No peripheral edema.    Respiratory: Clear and equal breath sounds.    GI: Soft, nontender, no masses or hepatosplenomegaly.       Assessment/Plan     Diagnoses and all orders for this visit:  Type 2 diabetes mellitus without complication, without long-term current use of insulin  -     metFORMIN (Glucophage) 500 mg tablet; Take 1 tablet (500 mg) by mouth every 12 hours. Take with food.  Hypertension, unspecified type  -     olmesartan (BENIcar) 40 mg tablet; Take 1 tablet (40 mg) by mouth once daily.  -     aspirin 81 mg EC tablet; Take 1 tablet (81 mg) by mouth once daily.  Hypothyroidism, unspecified type  -     Synthroid 150 mcg tablet; Take 1 tablet (150 mcg) by mouth once daily.  Hyperlipidemia, unspecified hyperlipidemia type  -     rosuvastatin (Crestor) 40 mg tablet; Take 1 tablet (40 mg) by mouth once daily.  -     icosapent ethyL (Vascepa) 1 gram capsule; Take 2 capsules (2 g) by mouth 2 times daily (morning and late afternoon).  Anxiety  -     sertraline (Zoloft) 100 mg tablet; Take 1 tablet (100 mg) by mouth once daily.  Gastroesophageal reflux disease, unspecified whether esophagitis present  -     esomeprazole (NexIUM) 40 mg DR capsule; Take 1 capsule (40 mg) by mouth once daily.  Motion sickness, sequela  -     scopolamine (Transderm-Scop) 1 mg over 3 days patch 3 day; Place 1 patch on the skin every 3rd day.  Situational anxiety  -     ALPRAZolam (Xanax) 0.5 mg tablet; Take 1 tablet (0.5 mg) by mouth 3 times a day as needed for anxiety.  Cutaneous candidiasis  -     nystatin (Mycostatin) cream; Apply topically 2 times a " day for 7 days. apply to affected area  Acute non-recurrent sinusitis, unspecified location  -     amoxicillin (Amoxil) 875 mg tablet; Take 1 tablet (875 mg) by mouth 2 times a day for 10 days.  Snoring  -     Home sleep apnea test (HSAT); Future  Fatigue, unspecified type  -     Home sleep apnea test (HSAT); Future    Assessment & Plan  Sinusitis  Symptoms suggest sinusitis with intermittent sore throat, headache, and congestion.   - Prescribe amoxicillin.  - Recommend Flonase nasal spray.  - Advise Afrin nasal spray or oral decongestant before flight.    Sleep Disturbance  Poor sleep quality possibly due to snoring. ? sleep apnea.  - Order home sleep study.    Tinnitus  - Refer to ENT specialist.    Hypothyroidism  Low TSH, normal free T4. On Synthroid.  - Monitor thyroid function tests.    Type 2 Diabetes Mellitus  Well-controlled with hemoglobin A1c at 6.1. On metformin.    Hyperlipidemia  Cholesterol mostly within target. Triglycerides slightly elevated.  - Continue lipid management.  - Consider lifestyle modifications for HDL.    - Schedule follow-up in six months.      Martha Thompson MD    This medical note was created with the assistance of artificial intelligence (AI) for documentation purposes. The content has been reviewed and confirmed by the healthcare provider for accuracy and completeness. Patient consented to the use of audio recording and use of AI during their visit.

## 2025-06-20 ENCOUNTER — APPOINTMENT (OUTPATIENT)
Dept: OPHTHALMOLOGY | Facility: CLINIC | Age: 65
End: 2025-06-20
Payer: COMMERCIAL

## 2025-06-24 ENCOUNTER — APPOINTMENT (OUTPATIENT)
Dept: PHYSICAL THERAPY | Facility: CLINIC | Age: 65
End: 2025-06-24
Payer: COMMERCIAL

## 2025-06-24 ENCOUNTER — APPOINTMENT (OUTPATIENT)
Dept: RADIOLOGY | Facility: CLINIC | Age: 65
End: 2025-06-24
Payer: COMMERCIAL

## 2025-06-24 DIAGNOSIS — M54.12 CERVICAL RADICULOPATHY: ICD-10-CM

## 2025-06-24 PROCEDURE — 72141 MRI NECK SPINE W/O DYE: CPT | Performed by: RADIOLOGY

## 2025-06-24 PROCEDURE — 72141 MRI NECK SPINE W/O DYE: CPT

## 2025-06-27 ENCOUNTER — TELEPHONE (OUTPATIENT)
Dept: NEUROSURGERY | Facility: CLINIC | Age: 65
End: 2025-06-27
Payer: COMMERCIAL

## 2025-06-27 NOTE — TELEPHONE ENCOUNTER
----- Message from Dhaval Tillman sent at 6/26/2025  3:37 PM EDT -----  Regarding: Please schedule  Please schedule with ?  Cervical stenosis    ----- Message -----  From: Interface, Radiology Results In  Sent: 6/26/2025   9:21 AM EDT  To: Dhaval Tillman PA-C

## 2025-06-30 ENCOUNTER — APPOINTMENT (OUTPATIENT)
Dept: PHYSICAL THERAPY | Facility: CLINIC | Age: 65
End: 2025-06-30
Payer: COMMERCIAL

## 2025-07-07 ENCOUNTER — APPOINTMENT (OUTPATIENT)
Dept: PHYSICAL THERAPY | Facility: CLINIC | Age: 65
End: 2025-07-07
Payer: COMMERCIAL

## 2025-07-07 DIAGNOSIS — M54.12 CERVICAL RADICULOPATHY: ICD-10-CM

## 2025-07-07 DIAGNOSIS — G89.29 NECK PAIN, CHRONIC: Primary | ICD-10-CM

## 2025-07-07 DIAGNOSIS — M54.2 NECK PAIN, CHRONIC: Primary | ICD-10-CM

## 2025-07-07 PROCEDURE — 97110 THERAPEUTIC EXERCISES: CPT | Mod: GP

## 2025-07-07 PROCEDURE — 97140 MANUAL THERAPY 1/> REGIONS: CPT | Mod: GP

## 2025-07-07 NOTE — PROGRESS NOTES
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 No pain/stiffness at end of treatment. Recommend patient add in self release between sessions; plan to assess between session response next visit.    Plan:  Continue per initial PT POC. Assess response between sessions.    Current Problem  1. Neck pain, chronic  Follow Up In Physical Therapy      2. Cervical radiculopathy  Follow Up In Physical Therapy          Problem List Items Addressed This Visit           ICD-10-CM    Neck pain, chronic - Primary M54.2, G89.29    Cervical radiculopathy M54.12       General   **Evaluation 05/16/2025 \"referred for cervical radiculopathy who presents with R sided neck pain and intermittent N&T located R sided neck, R superior shoulder\"    Precautions:   Precautions  STEADI Fall Risk Score (The score of 4 or more indicates an increased risk of falling): 0    Subjective    Still gets the sensation randomly to the shoulders, lasts a couple minutes. Exercise it can go away or goes away on its own.  HEP cervical retractions daily.  Worse with quick movements.  Watching body mechanics    Pain  3-4/10 neck stiffness    Objective       Treatments:  Therapeutic Exercise: Education on posture, positioning with use of lumbar roll, modification or discontinuation of any exercise that increases symptoms (i.e. peripheralization of symptoms).  Seated cervical " retraction x 20 reps  Seated cervical retraction with extension x 10 reps ea  Cervical submaximal isometrics rotation, SB x 10 reps ea  Standing cervical retractions x 10 reps   Standing scapular retractions with blue TB 2 x 10 reps   Pull downs with green TB 2 x 10 reps  Anti-rotation press red TB x 10 reps     Manual Therapy: gentle cervical distraction, STM cervical paraspinals    OP Education: verbal, demonstration, HEP handout     Access Code: [email or web address]  URL: [email or web address]  Date: 05/16/2025  Prepared by: Livia Chavez  - Seated Correct Posture   - Seated Cervical Retraction  - 1-3 x daily - 7 x weekly - 1-2 sets - 10 reps  - Seated Scapular Retraction  - 1 x daily - 7 x weekly - 2 sets - 10 reps  - Supine Neck Retraction with Towel  - 2-4 x daily - 7 x weekly - 1-2 sets - 10 reps     Goals:  Active       PT Problem       Patient will demonstrate cervical AROM without onset of N&T to improve ease with daily functional mobility       Start:  05/16/25    Expected End:  08/14/25            Patient will improve cervical rotation AROM by 5-10 degrees to improve driving       Start:  05/16/25    Expected End:  08/14/25            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

## 2025-07-22 ENCOUNTER — APPOINTMENT (OUTPATIENT)
Dept: PHYSICAL THERAPY | Facility: CLINIC | Age: 65
End: 2025-07-22
Payer: COMMERCIAL

## 2025-07-28 ENCOUNTER — APPOINTMENT (OUTPATIENT)
Dept: PHYSICAL THERAPY | Facility: CLINIC | Age: 65
End: 2025-07-28
Payer: COMMERCIAL

## 2025-08-04 ENCOUNTER — APPOINTMENT (OUTPATIENT)
Dept: PHYSICAL THERAPY | Facility: CLINIC | Age: 65
End: 2025-08-04
Payer: COMMERCIAL

## 2025-12-05 ENCOUNTER — APPOINTMENT (OUTPATIENT)
Dept: PRIMARY CARE | Facility: CLINIC | Age: 65
End: 2025-12-05
Payer: COMMERCIAL

## 2026-06-05 ENCOUNTER — APPOINTMENT (OUTPATIENT)
Dept: OPHTHALMOLOGY | Age: 66
End: 2026-06-05
Payer: COMMERCIAL